# Patient Record
Sex: FEMALE | URBAN - NONMETROPOLITAN AREA
[De-identification: names, ages, dates, MRNs, and addresses within clinical notes are randomized per-mention and may not be internally consistent; named-entity substitution may affect disease eponyms.]

---

## 2023-02-03 ENCOUNTER — TRANSCRIBE ORDERS (OUTPATIENT)
Dept: MAMMOGRAPHY | Facility: CLINIC | Age: 43
End: 2023-02-03

## 2023-02-03 DIAGNOSIS — Z12.39 ENCOUNTER FOR SCREENING FOR MALIGNANT NEOPLASM OF BREAST, UNSPECIFIED SCREENING MODALITY: Primary | ICD-10-CM

## 2023-07-17 ENCOUNTER — TELEPHONE (OUTPATIENT)
Dept: FAMILY MEDICINE CLINIC | Facility: CLINIC | Age: 43
End: 2023-07-17

## 2023-07-17 NOTE — TELEPHONE ENCOUNTER
"  Hub staff attempted to follow warm transfer process and was unsuccessful     Caller: Vee Garvin \"JAME\"    Relationship to patient: Self    Best call back number: [phone]       Patient is needing: TO BE SCHEDULED FOR LABS AS SOON AS POSSIBLE        "

## 2023-07-19 PROBLEM — R06.02 SHORTNESS OF BREATH: Status: ACTIVE | Noted: 2023-07-19

## 2023-07-19 PROBLEM — I10 ESSENTIAL HYPERTENSION: Status: ACTIVE | Noted: 2023-07-19

## 2023-07-19 PROBLEM — R94.31 ABNORMAL EKG: Status: ACTIVE | Noted: 2023-07-19

## 2023-07-19 PROBLEM — R07.89 CHEST PAIN, ATYPICAL: Status: ACTIVE | Noted: 2023-07-19

## 2023-07-19 PROBLEM — E66.01 MORBID OBESITY WITH BMI OF 40.0-44.9, ADULT: Status: ACTIVE | Noted: 2023-07-19

## 2023-07-24 ENCOUNTER — CLINICAL SUPPORT (OUTPATIENT)
Dept: CARDIOLOGY | Facility: CLINIC | Age: 43
End: 2023-07-24
Payer: COMMERCIAL

## 2023-07-24 ENCOUNTER — TELEPHONE (OUTPATIENT)
Dept: CARDIOLOGY | Facility: CLINIC | Age: 43
End: 2023-07-24
Payer: COMMERCIAL

## 2023-07-24 DIAGNOSIS — R00.2 PALPITATIONS: Primary | ICD-10-CM

## 2023-07-24 PROCEDURE — 93000 ELECTROCARDIOGRAM COMPLETE: CPT | Performed by: INTERNAL MEDICINE

## 2023-07-24 NOTE — PROGRESS NOTES
"Pt arrived and EKG done and Dr. Lane reviewed.  She does not feel the \"racing of heart\" at this time.  HR was 71.  BP R arm 150/80 and she states she has had a headache for about 1-2 hours and some blurred vision.  She states earlier the heart racing happened while sitting down and she did not feel anxious or stressed.  She is currently describing some chest discomfort 1-2/10.  Occasionally some dizziness throughout the day.  She started taking the new prescription, Valsartan, last Thursday night.  Education done that Valsartan is for BP and does not effect the HR.  I advised her I would call her tomorrow if Dr. Lane makes any changes.  A 48 holter monitor was placed and patient is scheduled Thursday to have her echo done.  Patient understands she should go to Er if symptoms worsen or feeling like she is going to pass out.    "

## 2023-07-24 NOTE — TELEPHONE ENCOUNTER
Spoke with Mrs. Garvin and she advised she saw Dr Lane on 7/19 and he started her on Valsartan 160mg and she started it on Thursday evening.  Last night right before she was ready to take her dose her heart started racing. Today she feels her BP is up because she is having a headach, foggy vision, and feeling her heart race again.  She is wondering if this is from the medication.  I explained the Valsartan does not effect the HR but does lower BP. She is not near a BP machine at this time.  She is agreeable to come to the office now for a BP, EKG, and holter monitor.

## 2023-07-25 PROCEDURE — 93000 ELECTROCARDIOGRAM COMPLETE: CPT | Performed by: INTERNAL MEDICINE

## 2023-07-25 NOTE — PROGRESS NOTES
ECG 12 Lead    Date/Time: 7/25/2023 8:14 AM  Performed by: Bill Lane MD  Authorized by: Bill Lane MD   Comparison: compared with previous ECG from 7/19/2023  Similar to previous ECG  Rhythm: sinus rhythm  Rate: normal  QRS axis: normal  Other findings: non-specific ST-T wave changes    Clinical impression: abnormal EKG

## 2023-07-26 ENCOUNTER — TELEPHONE (OUTPATIENT)
Dept: CARDIOLOGY | Facility: CLINIC | Age: 43
End: 2023-07-26
Payer: COMMERCIAL

## 2023-07-26 RX ORDER — VALSARTAN 320 MG/1
320 TABLET ORAL DAILY
COMMUNITY

## 2023-07-26 NOTE — TELEPHONE ENCOUNTER
"----- Message from Bill Lane MD sent at 7/24/2023  9:01 PM EDT -----  She needs to increase the Valsartan to 320 mg, will see the Holter  ----- Message -----  From: Kiersten Weston RN  Sent: 7/24/2023   5:19 PM EDT  To: Bill Lane MD    Here is my note from today's visit:    Signed · Encounter Date: 7/24/2023     Pt arrived and EKG done and Dr. Lane reviewed.  She does not feel the \"racing of heart\" at this time.  HR was 71.  BP R arm 150/80 and she states she has had a headache for about 1-2 hours and some blurred vision.  She states earlier the heart racing happened while sitting down and she did not feel anxious or stressed.  She is currently describing some chest discomfort 1-2/10.  Occasionally some dizziness throughout the day.  She started taking the new prescription, Valsartan, last Thursday night.  Education done that Valsartan is for BP and does not effect the HR.  I advised her I would call her tomorrow if Dr. Lane makes any changes.  A 48 holter monitor was placed and patient is scheduled Thursday to have her echo done.  Patient understands she should go to Er if symptoms worsen or feeling like she is going to pass out.                  "

## 2023-08-01 ENCOUNTER — OFFICE VISIT (OUTPATIENT)
Dept: CARDIOLOGY | Facility: CLINIC | Age: 43
End: 2023-08-01
Payer: COMMERCIAL

## 2023-08-01 VITALS
SYSTOLIC BLOOD PRESSURE: 112 MMHG | WEIGHT: 237 LBS | HEIGHT: 63 IN | OXYGEN SATURATION: 100 % | DIASTOLIC BLOOD PRESSURE: 70 MMHG | HEART RATE: 73 BPM | BODY MASS INDEX: 41.99 KG/M2 | RESPIRATION RATE: 18 BRPM

## 2023-08-01 DIAGNOSIS — E66.01 MORBID OBESITY WITH BMI OF 40.0-44.9, ADULT: ICD-10-CM

## 2023-08-01 DIAGNOSIS — I10 ESSENTIAL HYPERTENSION: Primary | ICD-10-CM

## 2023-08-01 DIAGNOSIS — R00.2 PALPITATIONS: ICD-10-CM

## 2023-08-01 PROCEDURE — 3074F SYST BP LT 130 MM HG: CPT | Performed by: INTERNAL MEDICINE

## 2023-08-01 PROCEDURE — 99213 OFFICE O/P EST LOW 20 MIN: CPT | Performed by: INTERNAL MEDICINE

## 2023-08-01 PROCEDURE — 1160F RVW MEDS BY RX/DR IN RCRD: CPT | Performed by: INTERNAL MEDICINE

## 2023-08-01 PROCEDURE — 1159F MED LIST DOCD IN RCRD: CPT | Performed by: INTERNAL MEDICINE

## 2023-08-01 PROCEDURE — 3078F DIAST BP <80 MM HG: CPT | Performed by: INTERNAL MEDICINE

## 2023-08-01 PROCEDURE — 93000 ELECTROCARDIOGRAM COMPLETE: CPT | Performed by: INTERNAL MEDICINE

## 2023-08-01 RX ORDER — VALSARTAN 160 MG/1
160 TABLET ORAL DAILY
Qty: 90 TABLET | Refills: 2 | Status: SHIPPED | OUTPATIENT
Start: 2023-08-01

## 2023-10-19 ENCOUNTER — OFFICE VISIT (OUTPATIENT)
Dept: FAMILY MEDICINE CLINIC | Facility: CLINIC | Age: 43
End: 2023-10-19
Payer: COMMERCIAL

## 2023-10-19 VITALS
BODY MASS INDEX: 45 KG/M2 | HEIGHT: 63 IN | WEIGHT: 254 LBS | HEART RATE: 69 BPM | SYSTOLIC BLOOD PRESSURE: 130 MMHG | OXYGEN SATURATION: 98 % | DIASTOLIC BLOOD PRESSURE: 74 MMHG

## 2023-10-19 DIAGNOSIS — K21.9 GASTROESOPHAGEAL REFLUX DISEASE, UNSPECIFIED WHETHER ESOPHAGITIS PRESENT: ICD-10-CM

## 2023-10-19 DIAGNOSIS — R13.19 ESOPHAGEAL DYSPHAGIA: Primary | ICD-10-CM

## 2023-10-19 RX ORDER — PANTOPRAZOLE SODIUM 40 MG/1
40 TABLET, DELAYED RELEASE ORAL DAILY
Qty: 90 TABLET | Refills: 1 | Status: SHIPPED | OUTPATIENT
Start: 2023-10-19

## 2023-10-19 NOTE — PROGRESS NOTES
Office Note     Name: Vee Garvin    : 1980     MRN: [id number]     Chief Complaint  surgery follow up    Subjective     History of Present Illness:  Vee Garvin is a 43 y.o. female who presents today for dysphagia    Answers submitted by the patient for this visit:  Office Visit on 10/19/2023  8:15 AM with Jamime Beyer (Submitted on 10/17/2023)  Please describe your symptoms.: Food and drink sometimes gets stuck going down while eating. Chest pain after eating certain foods. Burp up stomach acid sometimes.  Have you had these symptoms before?: Yes  How long have you been having these symptoms?: Greater than 2 weeks  Please describe any probable cause for these symptoms. 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 They prescribed me pepcid,  but never took it.    -when swallowing feels like both food and drink gets stuck for a few seconds. Happens more often with solid food  -She had an EGD back in 2016 and was told that she had an erosion in the duodenum.  She never took prescribed medication.  She recently had a surgery and after the surgery her anesthesiologist told her that there was evidence of erosion visible.  They recommended that she follow-up with her PCP      Past Medical History: History reviewed. No pertinent past medical history.    Past Surgical History: History reviewed. No pertinent surgical history.    Immunizations:   There is no immunization history on file for this patient.     Medications:     Current Outpatient Medications:     valsartan (DIOVAN) 160 MG tablet, Take 1 tablet by mouth Daily., Disp: 90 tablet, Rfl: 2    pantoprazole (Protonix) 40 MG EC tablet, Take 1 tablet by mouth Daily., Disp: 90 tablet, Rfl: 1    Allergies:   Allergies   Allergen Reactions    Demerol [Meperidine] Swelling    Tramadol Swelling       Family History: History reviewed. No pertinent family history.    Social History:   Social  "History     Socioeconomic History    Marital status:    Tobacco Use    Smoking status: Never    Smokeless tobacco: Never   Vaping Use    Vaping Use: Never used   Substance and Sexual Activity    Alcohol use: Not Currently    Drug use: Never    Sexual activity: Defer         Objective     Vital Signs  /74 (BP Location: Left arm, Patient Position: Sitting, Cuff Size: Large Adult)   Pulse 69   Ht 160 cm (63\")   Wt 115 kg (254 lb)   SpO2 98%   BMI 44.99 kg/m²   Estimated body mass index is 44.99 kg/m² as calculated from the following:    Height as of this encounter: 160 cm (63\").    Weight as of this encounter: 115 kg (254 lb).    Physical Exam  Constitutional:       General: She is not in acute distress.     Appearance: Normal appearance.   HENT:      Head: Normocephalic and atraumatic.   Eyes:      Conjunctiva/sclera: Conjunctivae normal.   Cardiovascular:      Rate and Rhythm: Normal rate and regular rhythm.   Pulmonary:      Effort: Pulmonary effort is normal. No respiratory distress.   Abdominal:      General: Abdomen is flat. Bowel sounds are normal.      Palpations: Abdomen is soft.      Tenderness: There is no abdominal tenderness.   Skin:     General: Skin is warm and dry.   Neurological:      Mental Status: She is alert.   Psychiatric:         Mood and Affect: Mood normal.         Behavior: Behavior normal.         Thought Content: Thought content normal.          Assessment and Plan     Diagnoses and all orders for this visit:    1. Esophageal dysphagia (Primary)  -     pantoprazole (Protonix) 40 MG EC tablet; Take 1 tablet by mouth Daily.  Dispense: 90 tablet; Refill: 1  -     Ambulatory Referral to Gastroenterology    2. Gastroesophageal reflux disease, unspecified whether esophagitis present  -     pantoprazole (Protonix) 40 MG EC tablet; Take 1 tablet by mouth Daily.  Dispense: 90 tablet; Refill: 1  -     Ambulatory Referral to Gastroenterology    With known untreated erosive disease and " new dysphagia, I feel that patient would benefit from referral to gastroenterology for further work-up and likely EGD.  In the meantime we will go on and start patient on Protonix.  She is agreeable to taking this medication.  We will start with 40 mg once a day, can increase this.  She can follow-up at her earliest convenience for an annual wellness visit.         DO AVA Garza [facility]  [address] [address] [phone]

## 2023-11-13 ENCOUNTER — OFFICE VISIT (OUTPATIENT)
Dept: CARDIOLOGY | Facility: CLINIC | Age: 43
End: 2023-11-13
Payer: COMMERCIAL

## 2023-11-13 VITALS
OXYGEN SATURATION: 99 % | WEIGHT: 256 LBS | HEART RATE: 82 BPM | DIASTOLIC BLOOD PRESSURE: 74 MMHG | SYSTOLIC BLOOD PRESSURE: 116 MMHG | BODY MASS INDEX: 45.36 KG/M2 | TEMPERATURE: 98 F | RESPIRATION RATE: 18 BRPM | HEIGHT: 63 IN

## 2023-11-13 DIAGNOSIS — E66.01 MORBID OBESITY WITH BMI OF 40.0-44.9, ADULT: ICD-10-CM

## 2023-11-13 DIAGNOSIS — R00.2 PALPITATIONS: ICD-10-CM

## 2023-11-13 DIAGNOSIS — I10 ESSENTIAL HYPERTENSION: Primary | ICD-10-CM

## 2023-11-13 PROCEDURE — 3074F SYST BP LT 130 MM HG: CPT

## 2023-11-13 PROCEDURE — 1160F RVW MEDS BY RX/DR IN RCRD: CPT

## 2023-11-13 PROCEDURE — 1159F MED LIST DOCD IN RCRD: CPT

## 2023-11-13 PROCEDURE — 99213 OFFICE O/P EST LOW 20 MIN: CPT

## 2023-11-13 PROCEDURE — 3078F DIAST BP <80 MM HG: CPT

## 2023-11-13 NOTE — PROGRESS NOTES
[facility] [place]  [facility] CARDIOLOGY  [address] [place] [address]  Dept: [phone]  Dept Fax: [phone]    Vee Garvin  1980    Follow Up Office Visit Note    History of Present Illness:  Vee Garvin is a 43 y.o. female who presents to the clinic for Follow-up. HTN, palpitations.  She is doing well today, denies all major cardiac complaints.  States her palpitations have resolved.  Historically had normal work-up with Holter and echo.  Exam today seems normal.  We did spend time today discussing lifestyle modifications, nutritional guidance and dietary recommendations given.  Exercise encouraged as tolerated for weight loss.  Follow in 6 months or sooner as indicated.    The following portions of the patient's history were reviewed and updated as appropriate: allergies, current medications, past family history, past medical history, past social history, past surgical history, and problem list.    Medications:  pantoprazole  valsartan    Subjective  Allergies   Allergen Reactions    Demerol [Meperidine] Swelling    Tramadol Swelling        History reviewed. No pertinent past medical history.    History reviewed. No pertinent surgical history.    History reviewed. No pertinent family history.     Social History     Socioeconomic History    Marital status:    Tobacco Use    Smoking status: Never    Smokeless tobacco: Never   Vaping Use    Vaping Use: Never used   Substance and Sexual Activity    Alcohol use: Not Currently    Drug use: Never    Sexual activity: Defer       Review of Systems   All other systems reviewed and are negative.      Cardiovascular Procedures    ECHO/MUGA:   STRESS TESTS:   CARDIAC CATH:   DEVICES:   HOLTER:   CT/MRI:   VASCULAR:   CARDIOTHORACIC:     Objective  Vitals:    11/13/23 0923   BP: 116/74   BP Location: Right arm   Patient Position: Lying   Cuff Size: Adult   Pulse: 82   Resp: 18   Temp: 98 °F (36.7 °C)   TempSrc:  "Infrared   SpO2: 99%   Weight: 116 kg (256 lb)   Height: 160 cm (63\")   PainSc: 0-No pain     Body mass index is 45.35 kg/m².     Physical Exam  Vitals reviewed.   Constitutional:       General: Awake.      Appearance: Normal and healthy appearance. Not in distress. Obese.   Neck:      Vascular: No JVR. JVD normal.   Pulmonary:      Effort: Pulmonary effort is normal.      Breath sounds: Normal breath sounds. No wheezing. No rhonchi. No rales.   Chest:      Chest wall: Not tender to palpatation.   Cardiovascular:      PMI at left midclavicular line. Normal rate. Regular rhythm. Normal S1. Normal S2.       Murmurs: There is no murmur.      No gallop.  No click. No rub.   Pulses:     Intact distal pulses.   Edema:     Peripheral edema absent.   Abdominal:      General: Bowel sounds are normal.      Palpations: Abdomen is soft.      Tenderness: There is no abdominal tenderness.   Musculoskeletal: Normal range of motion.         General: No tenderness. Skin:     General: Skin is warm and dry.   Neurological:      General: No focal deficit present.      Mental Status: Alert and oriented to person, place and time.   Psychiatric:         Behavior: Behavior is cooperative.          Diagnostic Data  Procedures    Advance Care Planning          Assessment and Plan  Diagnoses and all orders for this visit:    1. Essential hypertension (Primary)  On valsartan 160 daily, BP is good today.  Continue therapy.    2. Palpitations  Resolved per patient.  Holter and echo are normal.  Will observe.  Advised adequate oral hydration, stress management, avoid caffeine.    3. Morbid obesity with BMI of 40.0-44.9, adult  BMI 45.35.  Nutritional guidance with dietary recommendations given today.  Exercise encouraged as tolerated.         Return in 6 months (on 5/13/2024) for Recheck, Dr. Lane.    Karina Tan, APRN  11/13/2023    Part of this note may be an electronic transcription/translation of spoken language to printed text " using the Dragon Dictation System.

## 2023-11-20 ENCOUNTER — OFFICE VISIT (OUTPATIENT)
Dept: FAMILY MEDICINE CLINIC | Facility: CLINIC | Age: 43
End: 2023-11-20
Payer: COMMERCIAL

## 2023-11-20 VITALS
HEIGHT: 63 IN | WEIGHT: 257 LBS | SYSTOLIC BLOOD PRESSURE: 136 MMHG | HEART RATE: 71 BPM | BODY MASS INDEX: 45.54 KG/M2 | DIASTOLIC BLOOD PRESSURE: 80 MMHG | OXYGEN SATURATION: 97 %

## 2023-11-20 DIAGNOSIS — G56.03 BILATERAL CARPAL TUNNEL SYNDROME: Primary | ICD-10-CM

## 2023-11-20 DIAGNOSIS — R20.2 PARESTHESIAS: ICD-10-CM

## 2023-11-20 PROCEDURE — 1159F MED LIST DOCD IN RCRD: CPT | Performed by: FAMILY MEDICINE

## 2023-11-20 PROCEDURE — 99213 OFFICE O/P EST LOW 20 MIN: CPT | Performed by: FAMILY MEDICINE

## 2023-11-20 PROCEDURE — 3079F DIAST BP 80-89 MM HG: CPT | Performed by: FAMILY MEDICINE

## 2023-11-20 PROCEDURE — 3075F SYST BP GE 130 - 139MM HG: CPT | Performed by: FAMILY MEDICINE

## 2023-11-20 PROCEDURE — 1160F RVW MEDS BY RX/DR IN RCRD: CPT | Performed by: FAMILY MEDICINE

## 2023-11-20 NOTE — PROGRESS NOTES
Follow Up Office Visit      Patient Name: Vee Garvin  : 1980   MRN: [id number]     Chief Complaint:    Chief Complaint   Patient presents with   • Follow-up       History of Present Illness: Vee Garvin is a 43 y.o. female who is here today to   be evaluated for some hand numbness and tingling she says been going on for about 2 months now mostly when she is moving.  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 and that is 10 bother shot to counter rest and shake it out.  She denies any trauma to her upper extremities and she says she has occasionally dropped things.  She says occasionally it will wake her up at night.    Blood pressure was initially elevated but better on recheck    She said she did have pretty severe anemia back in 2019 and had to get 10 units of blood transfused.          Subjective      Review of Systems:   Review of Systems    Past Medical History: History reviewed. No pertinent past medical history.    Past Surgical History: History reviewed. No pertinent surgical history.    Family History: History reviewed. No pertinent family history.    Social History:   Social History     Socioeconomic History   • Marital status:    Tobacco Use   • Smoking status: Never   • Smokeless tobacco: Never   Vaping Use   • Vaping Use: Never used   Substance and Sexual Activity   • Alcohol use: Not Currently   • Drug use: Never   • Sexual activity: Defer       Medications:     Current Outpatient Medications:   •  pantoprazole (Protonix) 40 MG EC tablet, Take 1 tablet by mouth Daily., Disp: 90 tablet, Rfl: 1  •  valsartan (DIOVAN) 160 MG tablet, Take 1 tablet by mouth Daily., Disp: 90 tablet, Rfl: 2    Allergies:   Allergies   Allergen Reactions   • Demerol [Meperidine] Swelling   • Tramadol Swelling       Objective     Physical Exam:  Vital Signs:   Vitals:    23 0808 23 0833   BP: 150/94 136/80   BP Location: Left  "arm    Patient Position: Sitting    Cuff Size: Large Adult Large Adult   Pulse: 71    SpO2: 97%    Weight: 117 kg (257 lb)    Height: 160 cm (63\")      Body mass index is 45.53 kg/m².     Physical Exam  Constitutional:       Appearance: Normal appearance. She is obese.   HENT:      Nose: Nose normal.   Musculoskeletal:      Comments: Good  strength good strength of hands no joint tenderness redness or swelling 2+ radial pulses bilaterally elbows and shoulders unremarkable  Positive Tinel's on the left more so than the right and positive Phalen's bilaterally.   Skin:     General: Skin is warm and dry.      Comments: Mild acne on cheeks   Neurological:      Mental Status: She is alert.   Psychiatric:         Mood and Affect: Mood normal.         Behavior: Behavior normal.         Procedures    PHQ-9 Total Score:       Assessment / Plan      Assessment/Plan:   Diagnoses and all orders for this visit:    1. Bilateral carpal tunnel syndrome (Primary)  -     Ambulatory Referral to Physical Therapy    2. Paresthesias  -     TSH; Future  -     Vitamin B12; Future  -     Comprehensive Metabolic Panel; Future  -     CBC Auto Differential; Future  -     Ambulatory Referral to Physical Therapy  -     TSH  -     Vitamin B12  -     Comprehensive Metabolic Panel  -     CBC Auto Differential         We will get some blood work today    Referred to Dr. Diamond to get EMGs done for possible carpal tunnel.  In the short-term she can get a little cock-up wrist splint if she like, monitor symptoms if any worse return sooner  Otherwise  follow-up on blood work and test in about 1 month         Follow Up:   Return in about 1 month (around 12/20/2023) for Recheck.        Patrick Ibarra MD  [facility]   Portions of note created with Dragon voice recognition technology  "

## 2023-11-21 LAB
ALBUMIN SERPL-MCNC: 4.4 G/DL (ref 3.9–4.9)
ALBUMIN/GLOB SERPL: 1.7 {RATIO} (ref 1.2–2.2)
ALP SERPL-CCNC: 61 IU/L (ref 44–121)
ALT SERPL-CCNC: 24 IU/L (ref 0–32)
AST SERPL-CCNC: 20 IU/L (ref 0–40)
BASOPHILS # BLD AUTO: 0 X10E3/UL (ref 0–0.2)
BASOPHILS NFR BLD AUTO: 1 %
BILIRUB SERPL-MCNC: 0.8 MG/DL (ref 0–1.2)
BUN SERPL-MCNC: 15 MG/DL (ref 6–24)
BUN/CREAT SERPL: 21 (ref 9–23)
CALCIUM SERPL-MCNC: 9.4 MG/DL (ref 8.7–10.2)
CHLORIDE SERPL-SCNC: 102 MMOL/L (ref 96–106)
CO2 SERPL-SCNC: 24 MMOL/L (ref 20–29)
CREAT SERPL-MCNC: 0.73 MG/DL (ref 0.57–1)
EGFRCR SERPLBLD CKD-EPI 2021: 105 ML/MIN/1.73
EOSINOPHIL # BLD AUTO: 0.5 X10E3/UL (ref 0–0.4)
EOSINOPHIL NFR BLD AUTO: 7 %
ERYTHROCYTE [DISTWIDTH] IN BLOOD BY AUTOMATED COUNT: 11.8 % (ref 11.7–15.4)
GLOBULIN SER CALC-MCNC: 2.6 G/DL (ref 1.5–4.5)
GLUCOSE SERPL-MCNC: 92 MG/DL (ref 70–99)
HCT VFR BLD AUTO: 38.2 % (ref 34–46.6)
HGB BLD-MCNC: 13 G/DL (ref 11.1–15.9)
IMM GRANULOCYTES # BLD AUTO: 0 X10E3/UL (ref 0–0.1)
IMM GRANULOCYTES NFR BLD AUTO: 0 %
LYMPHOCYTES # BLD AUTO: 1.7 X10E3/UL (ref 0.7–3.1)
LYMPHOCYTES NFR BLD AUTO: 25 %
MCH RBC QN AUTO: 31.3 PG (ref 26.6–33)
MCHC RBC AUTO-ENTMCNC: 34 G/DL (ref 31.5–35.7)
MCV RBC AUTO: 92 FL (ref 79–97)
MONOCYTES # BLD AUTO: 0.4 X10E3/UL (ref 0.1–0.9)
MONOCYTES NFR BLD AUTO: 6 %
NEUTROPHILS # BLD AUTO: 4.1 X10E3/UL (ref 1.4–7)
NEUTROPHILS NFR BLD AUTO: 61 %
PLATELET # BLD AUTO: 262 X10E3/UL (ref 150–450)
POTASSIUM SERPL-SCNC: 4.7 MMOL/L (ref 3.5–5.2)
PROT SERPL-MCNC: 7 G/DL (ref 6–8.5)
RBC # BLD AUTO: 4.16 X10E6/UL (ref 3.77–5.28)
SODIUM SERPL-SCNC: 138 MMOL/L (ref 134–144)
TSH SERPL DL<=0.005 MIU/L-ACNC: 3.76 UIU/ML (ref 0.45–4.5)
VIT B12 SERPL-MCNC: 554 PG/ML (ref 232–1245)
WBC # BLD AUTO: 6.7 X10E3/UL (ref 3.4–10.8)

## 2023-11-29 ENCOUNTER — TELEPHONE (OUTPATIENT)
Dept: FAMILY MEDICINE CLINIC | Facility: CLINIC | Age: 43
End: 2023-11-29

## 2023-11-29 NOTE — TELEPHONE ENCOUNTER
"  Caller: Vee Garvin \"JAME\"    Relationship: Self    Best call back number: [phone]    What is the best time to reach you: ANYTIME    Who are you requesting to speak with (clinical staff, provider,  specific staff member): CLINICAL STAFF    Do you know the name of the person who called: PATIENT/ JAME    What was the call regarding: PATIENT HAD NERVE TEST MONDAY 112723. WILL SHE BE REFERRED TO AN ORTHOPEDIC? SHE IS PRESENTLY SEEING AN ORTHOPEDIC OFFICE AND IS REQUESTING TO BE SEEN BY THIS ORTHOPEDIC    Is it okay if the provider responds through MyChart:           "

## 2023-11-30 ENCOUNTER — TELEPHONE (OUTPATIENT)
Dept: FAMILY MEDICINE CLINIC | Facility: CLINIC | Age: 43
End: 2023-11-30

## 2023-11-30 DIAGNOSIS — G56.03 BILATERAL CARPAL TUNNEL SYNDROME: Primary | ICD-10-CM

## 2023-11-30 NOTE — TELEPHONE ENCOUNTER
"Caller: Vee Garvin \"JAME\"    Relationship: Self    Best call back number: [phone]     What medication are you requesting: WRIST BRACES FOR BOTH HANDS    What are your current symptoms: BOTH WRIST IN PAIN WITH NUMBNESS AND TINGLING    How long have you been experiencing symptoms: A FEW MONTHS    Have you had these symptoms before:    [] Yes  [x] No    Have you been treated for these symptoms before:   [] Yes  [x] No    If a prescription is needed, what is your preferred pharmacy and phone number: [address] - [phone] [facility] [phone]      Additional notes:PATIENT IS REQUESTING WRIST BRACES FOR CARPEL TUNNEL.       PLEASE CALL PATIENT ONCE THE REQUEST HAS BEEN SENT IN AND APPROVED      "

## 2023-12-20 ENCOUNTER — OFFICE VISIT (OUTPATIENT)
Dept: FAMILY MEDICINE CLINIC | Facility: CLINIC | Age: 43
End: 2023-12-20
Payer: COMMERCIAL

## 2023-12-20 VITALS
HEART RATE: 64 BPM | OXYGEN SATURATION: 99 % | HEIGHT: 63 IN | BODY MASS INDEX: 45.36 KG/M2 | DIASTOLIC BLOOD PRESSURE: 88 MMHG | SYSTOLIC BLOOD PRESSURE: 120 MMHG | WEIGHT: 256 LBS

## 2023-12-20 DIAGNOSIS — I10 PRIMARY HYPERTENSION: ICD-10-CM

## 2023-12-20 DIAGNOSIS — G56.03 BILATERAL CARPAL TUNNEL SYNDROME: Primary | ICD-10-CM

## 2023-12-20 PROCEDURE — 99213 OFFICE O/P EST LOW 20 MIN: CPT | Performed by: FAMILY MEDICINE

## 2023-12-20 PROCEDURE — 3074F SYST BP LT 130 MM HG: CPT | Performed by: FAMILY MEDICINE

## 2023-12-20 PROCEDURE — 3079F DIAST BP 80-89 MM HG: CPT | Performed by: FAMILY MEDICINE

## 2023-12-20 PROCEDURE — 1160F RVW MEDS BY RX/DR IN RCRD: CPT | Performed by: FAMILY MEDICINE

## 2023-12-20 PROCEDURE — 1159F MED LIST DOCD IN RCRD: CPT | Performed by: FAMILY MEDICINE

## 2023-12-20 NOTE — PROGRESS NOTES
Follow Up Office Visit      Patient Name: Vee Garvin  : 1980   MRN: [id number]     Chief Complaint:    Chief Complaint   Patient presents with    Hypertension       History of Present Illness: Vee Garvin is a 43 y.o. female who is here today to   follow-up on recent EMG Dr. Mauricio her chiropractor did this testing did show some slowing at the wrist bilaterally.  She says she does drop things and does a lot of baking and is little bit of an aggravation for her.  Labs reviewed and they are all normal and she is doing well with her blood pressure follows up with Dr. Lane cardiology in a few months.    Review of Systems   Constitutional: Negative for fatigue and fever.   Respiratory: Negative for cough and shortness of breath.    Cardiovascular: Negative for chest pain and palpitations.   Skin: Negative for rash or itching      Subjective      Review of Systems:   Review of Systems    Past Medical History: History reviewed. No pertinent past medical history.    Past Surgical History: History reviewed. No pertinent surgical history.    Family History: History reviewed. No pertinent family history.    Social History:   Social History     Socioeconomic History    Marital status:    Tobacco Use    Smoking status: Never    Smokeless tobacco: Never   Vaping Use    Vaping Use: Never used   Substance and Sexual Activity    Alcohol use: Not Currently    Drug use: Never    Sexual activity: Defer       Medications:     Current Outpatient Medications:     pantoprazole (Protonix) 40 MG EC tablet, Take 1 tablet by mouth Daily., Disp: 90 tablet, Rfl: 1    valsartan (DIOVAN) 160 MG tablet, Take 1 tablet by mouth Daily., Disp: 90 tablet, Rfl: 2    Allergies:   Allergies   Allergen Reactions    Demerol [Meperidine] Swelling    Tramadol Swelling    Citrus Rash       Objective     Physical Exam:  Vital Signs:   Vitals:    23 1050   BP: 120/88   BP Location: Left arm   Patient Position:  "Sitting   Cuff Size: Large Adult   Pulse: 64   SpO2: 99%   Weight: 116 kg (256 lb)   Height: 160 cm (63\")   PainSc: 0-No pain     Body mass index is 45.35 kg/m².     Physical Exam  Vitals and nursing note reviewed.   Constitutional:       Appearance: Normal appearance.   HENT:      Head: Normocephalic and atraumatic.   Cardiovascular:      Rate and Rhythm: Normal rate and regular rhythm.   Pulmonary:      Effort: Pulmonary effort is normal.      Breath sounds: Normal breath sounds.   Musculoskeletal:         General: Normal range of motion.      Cervical back: Normal range of motion and neck supple.      Right lower leg: No edema.      Left lower leg: No edema.   Skin:     General: Skin is warm and dry.   Neurological:      General: No focal deficit present.      Mental Status: She is alert.         Procedures    PHQ-9 Total Score:       Assessment / Plan      Assessment/Plan:   Diagnoses and all orders for this visit:    1. Bilateral carpal tunnel syndrome (Primary)  -     Ambulatory Referral to Orthopedic Surgery    2. Primary hypertension  -     Comprehensive Metabolic Panel; Future         For her carpal tunnel she would like to go see Dr. Miller her orthopedist and we will refer her as requested in the short-term she says she really does not want to do any splinting or anything like that she may take Tylenol if she has pain as directed    For blood pressure continue with her current regimen and we will see her back in 6 months         Follow Up:   Return in about 6 months (around 6/20/2024) for Recheck, Labs prior next visit.        Patrick Ibarra MD  [facility] [place]   Portions of note created with Dragon voice recognition technology  "

## 2024-03-29 ENCOUNTER — TELEPHONE (OUTPATIENT)
Dept: CARDIOLOGY | Facility: CLINIC | Age: 44
End: 2024-03-29
Payer: COMMERCIAL

## 2024-03-29 RX ORDER — AMLODIPINE BESYLATE 5 MG/1
5 TABLET ORAL DAILY
Qty: 90 TABLET | Refills: 1 | Status: SHIPPED | OUTPATIENT
Start: 2024-03-29

## 2024-03-29 RX ORDER — AMLODIPINE BESYLATE 5 MG/1
5 TABLET ORAL DAILY
COMMUNITY
End: 2024-03-29 | Stop reason: SDUPTHER

## 2024-03-29 NOTE — TELEPHONE ENCOUNTER
Phone call to pt and she is having some memory issues and per side effects of Valsartan is confusion and memory loss per the pt. Also having head aches, she see's a therapist and she wanted the patient to let you know, she has a appt with you 05/10/2024

## 2024-03-29 NOTE — TELEPHONE ENCOUNTER
"Pt calling to discuss Valsartan rx. Believes the rx is causing various cognitive issues such as forgetfulness, misplacing items, etc. She stated it feels like her \"brain is getting worse.\" Please call pt to discuss.  "

## 2024-04-17 ENCOUNTER — CLINICAL SUPPORT (OUTPATIENT)
Dept: CARDIOLOGY | Facility: CLINIC | Age: 44
End: 2024-04-17
Payer: COMMERCIAL

## 2024-04-17 DIAGNOSIS — R00.2 PALPITATIONS: Primary | ICD-10-CM

## 2024-04-17 NOTE — PROGRESS NOTES
Dr Lane asked pt if she wanted to have a Holter or use her fit bit . She will use her fit bit and record EKG and send them to us by email or print them off .    ECG 12 Lead    Date/Time: 4/18/2024 8:28 AM  Performed by: Bill Lane MD    Authorized by: Bill Lane MD  Comparison: compared with previous ECG from 8/1/2023  Similar to previous ECG  Rhythm: sinus rhythm  Rate: normal  BPM: 61  QRS axis: normal  Other findings: non-specific ST-T wave changes    Clinical impression: abnormal EKG

## 2024-05-09 ENCOUNTER — OFFICE VISIT (OUTPATIENT)
Dept: FAMILY MEDICINE CLINIC | Facility: CLINIC | Age: 44
End: 2024-05-09
Payer: COMMERCIAL

## 2024-05-09 VITALS
HEART RATE: 73 BPM | WEIGHT: 272.5 LBS | OXYGEN SATURATION: 98 % | BODY MASS INDEX: 48.28 KG/M2 | SYSTOLIC BLOOD PRESSURE: 144 MMHG | DIASTOLIC BLOOD PRESSURE: 98 MMHG | HEIGHT: 63 IN

## 2024-05-09 DIAGNOSIS — R53.83 FATIGUE, UNSPECIFIED TYPE: ICD-10-CM

## 2024-05-09 DIAGNOSIS — R42 DIZZINESS: ICD-10-CM

## 2024-05-09 DIAGNOSIS — R00.2 PALPITATIONS: Primary | ICD-10-CM

## 2024-05-09 PROCEDURE — 1159F MED LIST DOCD IN RCRD: CPT | Performed by: STUDENT IN AN ORGANIZED HEALTH CARE EDUCATION/TRAINING PROGRAM

## 2024-05-09 PROCEDURE — 3077F SYST BP >= 140 MM HG: CPT | Performed by: STUDENT IN AN ORGANIZED HEALTH CARE EDUCATION/TRAINING PROGRAM

## 2024-05-09 PROCEDURE — 1160F RVW MEDS BY RX/DR IN RCRD: CPT | Performed by: STUDENT IN AN ORGANIZED HEALTH CARE EDUCATION/TRAINING PROGRAM

## 2024-05-09 PROCEDURE — 1126F AMNT PAIN NOTED NONE PRSNT: CPT | Performed by: STUDENT IN AN ORGANIZED HEALTH CARE EDUCATION/TRAINING PROGRAM

## 2024-05-09 PROCEDURE — 99214 OFFICE O/P EST MOD 30 MIN: CPT | Performed by: STUDENT IN AN ORGANIZED HEALTH CARE EDUCATION/TRAINING PROGRAM

## 2024-05-09 PROCEDURE — 3080F DIAST BP >= 90 MM HG: CPT | Performed by: STUDENT IN AN ORGANIZED HEALTH CARE EDUCATION/TRAINING PROGRAM

## 2024-05-09 RX ORDER — VALSARTAN 40 MG/1
160 TABLET ORAL DAILY
COMMUNITY
End: 2024-05-10 | Stop reason: ALTCHOICE

## 2024-05-10 ENCOUNTER — OFFICE VISIT (OUTPATIENT)
Dept: CARDIOLOGY | Facility: CLINIC | Age: 44
End: 2024-05-10
Payer: COMMERCIAL

## 2024-05-10 VITALS
RESPIRATION RATE: 12 BRPM | OXYGEN SATURATION: 96 % | WEIGHT: 271 LBS | HEART RATE: 73 BPM | SYSTOLIC BLOOD PRESSURE: 130 MMHG | BODY MASS INDEX: 48.02 KG/M2 | HEIGHT: 63 IN | DIASTOLIC BLOOD PRESSURE: 80 MMHG

## 2024-05-10 DIAGNOSIS — R00.2 PALPITATIONS: ICD-10-CM

## 2024-05-10 DIAGNOSIS — I10 ESSENTIAL HYPERTENSION: ICD-10-CM

## 2024-05-10 DIAGNOSIS — R07.89 CHEST PAIN, ATYPICAL: Primary | ICD-10-CM

## 2024-05-10 DIAGNOSIS — E66.01 MORBID OBESITY WITH BMI OF 40.0-44.9, ADULT: ICD-10-CM

## 2024-05-10 DIAGNOSIS — R06.02 SHORTNESS OF BREATH: ICD-10-CM

## 2024-05-10 LAB
ALBUMIN SERPL-MCNC: 4.2 G/DL (ref 3.9–4.9)
ALBUMIN/GLOB SERPL: 1.5 {RATIO} (ref 1.2–2.2)
ALP SERPL-CCNC: 54 IU/L (ref 44–121)
ALT SERPL-CCNC: 19 IU/L (ref 0–32)
AST SERPL-CCNC: 14 IU/L (ref 0–40)
BASOPHILS # BLD AUTO: 0.1 X10E3/UL (ref 0–0.2)
BASOPHILS NFR BLD AUTO: 1 %
BILIRUB SERPL-MCNC: 0.9 MG/DL (ref 0–1.2)
BUN SERPL-MCNC: 17 MG/DL (ref 6–24)
BUN/CREAT SERPL: 25 (ref 9–23)
CALCIUM SERPL-MCNC: 8.9 MG/DL (ref 8.7–10.2)
CHLORIDE SERPL-SCNC: 104 MMOL/L (ref 96–106)
CO2 SERPL-SCNC: 21 MMOL/L (ref 20–29)
CREAT SERPL-MCNC: 0.69 MG/DL (ref 0.57–1)
EGFRCR SERPLBLD CKD-EPI 2021: 110 ML/MIN/1.73
EOSINOPHIL # BLD AUTO: 0.3 X10E3/UL (ref 0–0.4)
EOSINOPHIL NFR BLD AUTO: 4 %
ERYTHROCYTE [DISTWIDTH] IN BLOOD BY AUTOMATED COUNT: 12.8 % (ref 11.7–15.4)
GLOBULIN SER CALC-MCNC: 2.8 G/DL (ref 1.5–4.5)
GLUCOSE SERPL-MCNC: 87 MG/DL (ref 70–99)
HBA1C MFR BLD: 5.1 % (ref 4.8–5.6)
HCT VFR BLD AUTO: 40.6 % (ref 34–46.6)
HGB BLD-MCNC: 13.8 G/DL (ref 11.1–15.9)
IMM GRANULOCYTES # BLD AUTO: 0 X10E3/UL (ref 0–0.1)
IMM GRANULOCYTES NFR BLD AUTO: 0 %
LYMPHOCYTES # BLD AUTO: 1.8 X10E3/UL (ref 0.7–3.1)
LYMPHOCYTES NFR BLD AUTO: 24 %
MCH RBC QN AUTO: 31.4 PG (ref 26.6–33)
MCHC RBC AUTO-ENTMCNC: 34 G/DL (ref 31.5–35.7)
MCV RBC AUTO: 92 FL (ref 79–97)
MONOCYTES # BLD AUTO: 0.3 X10E3/UL (ref 0.1–0.9)
MONOCYTES NFR BLD AUTO: 4 %
NEUTROPHILS # BLD AUTO: 5.1 X10E3/UL (ref 1.4–7)
NEUTROPHILS NFR BLD AUTO: 67 %
PLATELET # BLD AUTO: 275 X10E3/UL (ref 150–450)
POTASSIUM SERPL-SCNC: 4.3 MMOL/L (ref 3.5–5.2)
PROT SERPL-MCNC: 7 G/DL (ref 6–8.5)
RBC # BLD AUTO: 4.4 X10E6/UL (ref 3.77–5.28)
SODIUM SERPL-SCNC: 139 MMOL/L (ref 134–144)
TSH SERPL DL<=0.005 MIU/L-ACNC: 2.74 UIU/ML (ref 0.45–4.5)
WBC # BLD AUTO: 7.5 X10E3/UL (ref 3.4–10.8)

## 2024-05-10 PROCEDURE — 3075F SYST BP GE 130 - 139MM HG: CPT | Performed by: INTERNAL MEDICINE

## 2024-05-10 PROCEDURE — 99214 OFFICE O/P EST MOD 30 MIN: CPT | Performed by: INTERNAL MEDICINE

## 2024-05-10 PROCEDURE — 93000 ELECTROCARDIOGRAM COMPLETE: CPT | Performed by: INTERNAL MEDICINE

## 2024-05-10 PROCEDURE — 1160F RVW MEDS BY RX/DR IN RCRD: CPT | Performed by: INTERNAL MEDICINE

## 2024-05-10 PROCEDURE — 1159F MED LIST DOCD IN RCRD: CPT | Performed by: INTERNAL MEDICINE

## 2024-05-10 PROCEDURE — 3079F DIAST BP 80-89 MM HG: CPT | Performed by: INTERNAL MEDICINE

## 2024-05-10 RX ORDER — VALSARTAN 160 MG/1
160 TABLET ORAL DAILY
Qty: 90 TABLET | Refills: 3 | Status: SHIPPED | OUTPATIENT
Start: 2024-05-10

## 2024-05-14 LAB
APO B SERPL-MCNC: 86 MG/DL
CHOLEST SERPL-MCNC: 174 MG/DL (ref 100–199)
HDLC SERPL-MCNC: 48 MG/DL
LDLC SERPL CALC-MCNC: 115 MG/DL (ref 0–99)
LPA SERPL-SCNC: 34 NMOL/L
TRIGL SERPL-MCNC: 57 MG/DL (ref 0–149)
VLDLC SERPL CALC-MCNC: 11 MG/DL (ref 5–40)

## 2024-05-15 ENCOUNTER — TELEPHONE (OUTPATIENT)
Dept: CARDIOLOGY | Facility: CLINIC | Age: 44
End: 2024-05-15
Payer: COMMERCIAL

## 2024-05-15 NOTE — TELEPHONE ENCOUNTER
Left a message for Ms Garvin that her Apo B and Lpa are good, Ldl is mildly elevated 115.  Please eat low carbs and also exercise

## 2024-05-30 ENCOUNTER — TELEPHONE (OUTPATIENT)
Dept: CARDIOLOGY | Facility: CLINIC | Age: 44
End: 2024-05-30
Payer: COMMERCIAL

## 2024-05-30 ENCOUNTER — CLINICAL SUPPORT (OUTPATIENT)
Dept: CARDIOLOGY | Facility: CLINIC | Age: 44
End: 2024-05-30
Payer: COMMERCIAL

## 2024-05-30 DIAGNOSIS — R94.31 ABNORMAL EKG: Primary | ICD-10-CM

## 2024-05-30 NOTE — TELEPHONE ENCOUNTER
Spoke with Mr. Garvin and he is going to relay to Mrs Garvin that Dr. Lane reviewed v/s and EKG and all was good.  He would like to wait for you to have your echo to decide further how to treat.

## 2024-05-30 NOTE — PROGRESS NOTES
Per Mrs Garvin around 3am on 5/24 she states her heart and body just felt like it was jittery and she did not feel right.  She took her BP and the reading was 188/139.  She has her BP cuff with her and I advised the cuff she is using is an adult and too small for her arm and that can cause wrong readings.  She showed the picture of the BP screen.  She stated she did have a little chest pain lasting about 10 minutes during the episode. She states she had to get up out of bed and walk a round for a while before symptoms cleared up.  I went over the holter monitor results with her and explained the findings.  You do have PVC's 4% of time but otherwise a benign monitor study.  Your EKG today looks like previous but I will have Dr. Lane review and if any recommendations will call her later.  Continue to have the echo on 6/7 and f/u with Dr. Lane on 6/11. Pt verbalized understanding.       EKG done.    /74  Hr 74, 99%    ECG 12 Lead    Date/Time: 5/31/2024 4:37 PM  Performed by: Bill Lane MD    Authorized by: Bill Lane MD  Comparison: compared with previous ECG from 5/10/2024  Similar to previous ECG  Rhythm: sinus rhythm  Ectopy: atrial premature contractions  Rate: normal  BPM: 73  QRS axis: normal    Clinical impression: abnormal EKG

## 2024-05-31 PROCEDURE — 93000 ELECTROCARDIOGRAM COMPLETE: CPT | Performed by: INTERNAL MEDICINE

## 2024-06-11 ENCOUNTER — OFFICE VISIT (OUTPATIENT)
Dept: CARDIOLOGY | Facility: CLINIC | Age: 44
End: 2024-06-11
Payer: COMMERCIAL

## 2024-06-11 VITALS
HEIGHT: 63 IN | OXYGEN SATURATION: 96 % | RESPIRATION RATE: 18 BRPM | DIASTOLIC BLOOD PRESSURE: 84 MMHG | BODY MASS INDEX: 48.2 KG/M2 | WEIGHT: 272 LBS | SYSTOLIC BLOOD PRESSURE: 136 MMHG | HEART RATE: 84 BPM

## 2024-06-11 DIAGNOSIS — R07.89 CHEST PAIN, ATYPICAL: Primary | ICD-10-CM

## 2024-06-11 DIAGNOSIS — E66.01 MORBID OBESITY WITH BMI OF 40.0-44.9, ADULT: ICD-10-CM

## 2024-06-11 DIAGNOSIS — R00.2 PALPITATIONS: ICD-10-CM

## 2024-06-11 DIAGNOSIS — R94.31 ABNORMAL EKG: ICD-10-CM

## 2024-06-11 DIAGNOSIS — R06.02 SHORTNESS OF BREATH: ICD-10-CM

## 2024-06-11 PROCEDURE — 3079F DIAST BP 80-89 MM HG: CPT | Performed by: INTERNAL MEDICINE

## 2024-06-11 PROCEDURE — 3075F SYST BP GE 130 - 139MM HG: CPT | Performed by: INTERNAL MEDICINE

## 2024-06-11 PROCEDURE — 1159F MED LIST DOCD IN RCRD: CPT | Performed by: INTERNAL MEDICINE

## 2024-06-11 PROCEDURE — 1160F RVW MEDS BY RX/DR IN RCRD: CPT | Performed by: INTERNAL MEDICINE

## 2024-06-11 PROCEDURE — 99213 OFFICE O/P EST LOW 20 MIN: CPT | Performed by: INTERNAL MEDICINE

## 2024-06-11 RX ORDER — METOPROLOL SUCCINATE 50 MG/1
50 TABLET, EXTENDED RELEASE ORAL DAILY
Qty: 30 TABLET | Refills: 11 | Status: SHIPPED | OUTPATIENT
Start: 2024-06-11

## 2024-06-11 NOTE — PROGRESS NOTES
[facility] [place]  [facility] CARDIOLOGY  [address] [place] [address]  Dept: [phone]  Dept Fax: [phone]    Vee Garvin  1980    Follow Up Office Visit Note    History of Present Illness:  Vee Garvin is a 44 y.o. female who presents to the clinic for Follow-up. Palpitations - She has the Holter with 4% PVC., her complaints are inconsistent with the PVC`S,  her echo was also normal, will use Toprol xl 50 mg BP is 130.80, she takes Valsartan 160 mg    The following portions of the patient's history were reviewed and updated as appropriate: allergies, current medications, past family history, past medical history, past social history, past surgical history, and problem list.    Medications:  metoprolol succinate XL  valsartan    Subjective  Allergies   Allergen Reactions    Demerol [Meperidine] Swelling    Tramadol Swelling    [place] Rash        Past Medical History:   Diagnosis Date    Anemia     Cat scratch fever     Hypertension     Scurvy        Past Surgical History:   Procedure Laterality Date    KNEE SURGERY         Family History   Problem Relation Age of Onset    Asthma Mother     Hypertension Mother     Atrial fibrillation Mother     Fibroids Mother     Diabetes Father     Hypertension Father     Neuropathy Father     High cholesterol Father         Social History     Socioeconomic History    Marital status:    Tobacco Use    Smoking status: Never    Smokeless tobacco: Never   Vaping Use    Vaping status: Never Used   Substance and Sexual Activity    Alcohol use: Not Currently    Drug use: Never    Sexual activity: Defer       Review of Systems   Constitutional: Negative.    HENT: Negative.     Respiratory: Negative.     Cardiovascular: Negative.  Positive for palpitations.   Endocrine: Negative.    Genitourinary: Negative.    Musculoskeletal: Negative.    Skin: Negative.    Allergic/Immunologic: Negative.    Neurological: Negative.   "  Hematological: Negative.    Psychiatric/Behavioral: Negative.       Cardiovascular Procedures    ECHO/MUGA:  STRESS TESTS:   CARDIAC CATH:   DEVICES:   HOLTER:   CT/MRI:   VASCULAR:   CARDIOTHORACIC:     Objective  Vitals:    06/11/24 1039   BP: 136/84   BP Location: Right arm   Patient Position: Lying   Cuff Size: Adult   Pulse: 84   Resp: 18   SpO2: 96%   Weight: 123 kg (272 lb)   Height: 160 cm (63\")   PainSc: 0-No pain     Body mass index is 48.18 kg/m².     Physical Exam  Vitals reviewed.   Constitutional:       Appearance: Healthy appearance. Not in distress.   Neck:      Vascular: No JVR. JVD normal.   Pulmonary:      Effort: Pulmonary effort is normal.      Breath sounds: Normal breath sounds. No wheezing. No rhonchi. No rales.   Chest:      Chest wall: Not tender to palpatation.   Cardiovascular:      PMI at left midclavicular line. Normal rate. Regular rhythm. Normal S1. Normal S2.       Murmurs: There is no murmur.      No gallop.  No click. No rub.   Pulses:     Intact distal pulses.   Edema:     Peripheral edema absent.   Abdominal:      General: Bowel sounds are normal.      Palpations: Abdomen is soft.      Tenderness: There is no abdominal tenderness.   Musculoskeletal: Normal range of motion.         General: No tenderness. Skin:     General: Skin is warm and dry.   Neurological:      General: No focal deficit present.      Mental Status: Alert and oriented to person, place and time.        Diagnostic Data  Procedures    Assessment and Plan  Diagnoses and all orders for this visit:    Chest pain, atypical-seems more related to the palpitations    Palpitations- occasional to frequents PVC`s, will start Toprol xl 50 mg    Shortness of breath- echo normal, likely related to her weight    Morbid obesity with BMI of 40.0-44.9, adult- as above BMI 48 advised to exercise, and eat low carbs      Other orders  -     metoprolol succinate XL (TOPROL-XL) 50 MG 24 hr tablet; Take 1 tablet by mouth Daily.     "     Return in about 2 months (around 8/11/2024) for Recheck with Dr. Lane.    Bill Lane MD  06/11/2024

## 2024-06-13 ENCOUNTER — LAB (OUTPATIENT)
Dept: FAMILY MEDICINE CLINIC | Facility: CLINIC | Age: 44
End: 2024-06-13
Payer: COMMERCIAL

## 2024-06-13 DIAGNOSIS — I10 PRIMARY HYPERTENSION: ICD-10-CM

## 2024-06-13 PROCEDURE — 36415 COLL VENOUS BLD VENIPUNCTURE: CPT | Performed by: FAMILY MEDICINE

## 2024-06-14 LAB
ALBUMIN SERPL-MCNC: 4.2 G/DL (ref 3.9–4.9)
ALBUMIN/GLOB SERPL: 1.5 {RATIO}
ALP SERPL-CCNC: 54 IU/L (ref 44–121)
ALT SERPL-CCNC: 19 IU/L (ref 0–32)
AST SERPL-CCNC: 17 IU/L (ref 0–40)
BILIRUB SERPL-MCNC: 0.9 MG/DL (ref 0–1.2)
BUN SERPL-MCNC: 16 MG/DL (ref 6–24)
BUN/CREAT SERPL: 24 (ref 9–23)
CALCIUM SERPL-MCNC: 9.2 MG/DL (ref 8.7–10.2)
CHLORIDE SERPL-SCNC: 102 MMOL/L (ref 96–106)
CO2 SERPL-SCNC: 21 MMOL/L (ref 20–29)
CREAT SERPL-MCNC: 0.68 MG/DL (ref 0.57–1)
EGFRCR SERPLBLD CKD-EPI 2021: 110 ML/MIN/1.73
GLOBULIN SER CALC-MCNC: 2.8 G/DL (ref 1.5–4.5)
GLUCOSE SERPL-MCNC: 90 MG/DL (ref 70–99)
POTASSIUM SERPL-SCNC: 4.1 MMOL/L (ref 3.5–5.2)
PROT SERPL-MCNC: 7 G/DL (ref 6–8.5)
SODIUM SERPL-SCNC: 137 MMOL/L (ref 134–144)

## 2024-06-24 ENCOUNTER — OFFICE VISIT (OUTPATIENT)
Dept: FAMILY MEDICINE CLINIC | Facility: CLINIC | Age: 44
End: 2024-06-24
Payer: COMMERCIAL

## 2024-06-24 VITALS
HEIGHT: 63 IN | OXYGEN SATURATION: 99 % | DIASTOLIC BLOOD PRESSURE: 86 MMHG | WEIGHT: 269 LBS | HEART RATE: 78 BPM | BODY MASS INDEX: 47.66 KG/M2 | SYSTOLIC BLOOD PRESSURE: 134 MMHG

## 2024-06-24 DIAGNOSIS — R53.83 FATIGUE, UNSPECIFIED TYPE: ICD-10-CM

## 2024-06-24 DIAGNOSIS — M25.50 ARTHRALGIA, UNSPECIFIED JOINT: ICD-10-CM

## 2024-06-24 DIAGNOSIS — N95.1 MENOPAUSAL SYMPTOMS: ICD-10-CM

## 2024-06-24 DIAGNOSIS — N91.2 AMENORRHEA: Primary | ICD-10-CM

## 2024-06-24 LAB
B-HCG UR QL: NEGATIVE
EXPIRATION DATE: NORMAL
INTERNAL NEGATIVE CONTROL: NORMAL
INTERNAL POSITIVE CONTROL: NORMAL
Lab: NORMAL

## 2024-06-24 PROCEDURE — 3079F DIAST BP 80-89 MM HG: CPT | Performed by: FAMILY MEDICINE

## 2024-06-24 PROCEDURE — 3075F SYST BP GE 130 - 139MM HG: CPT | Performed by: FAMILY MEDICINE

## 2024-06-24 PROCEDURE — 1159F MED LIST DOCD IN RCRD: CPT | Performed by: FAMILY MEDICINE

## 2024-06-24 PROCEDURE — 99214 OFFICE O/P EST MOD 30 MIN: CPT | Performed by: FAMILY MEDICINE

## 2024-06-24 PROCEDURE — 81025 URINE PREGNANCY TEST: CPT | Performed by: FAMILY MEDICINE

## 2024-06-24 PROCEDURE — 1126F AMNT PAIN NOTED NONE PRSNT: CPT | Performed by: FAMILY MEDICINE

## 2024-06-24 PROCEDURE — 1160F RVW MEDS BY RX/DR IN RCRD: CPT | Performed by: FAMILY MEDICINE

## 2024-06-24 PROCEDURE — 36415 COLL VENOUS BLD VENIPUNCTURE: CPT | Performed by: FAMILY MEDICINE

## 2024-06-24 NOTE — PROGRESS NOTES
Follow Up Office Visit      Patient Name: Vee Garvin  : 1980   MRN: [id number]     Chief Complaint:    Chief Complaint   Patient presents with    Follow-up       History of Present Illness: Vee Garvin is a 44 y.o. female who is here today to   be evaluated for her hormone says she has not had a period since February and feels like she is menopausal as she has hot flashes and then goff can be happy and then will start crying and also says she has had fatigue and is tired a lot and forgetfulness    Relates she has had irregular periods for about 5 years and saw gynecology and had an ultrasound and some workups and they were going to start her on birth control pills but she did not want to do this    More recently she saw another provider who told her there is no way to check her hormone status and she will just have to live with it.--She is not completely sure if she wants to take a medicine but does have these mood swings and may cry  at times  She also relates that she struggled with low libido--but relates that her  has been patient--he has had a vasectomy so she does not feel she is pregnant    She also complains of joint aches mostly her hands and feet and a little bit of low back pain    She does see cardiology and has been diagnosed with PVCs    She says she has had a rash on her face off and on    Review of Systems   Constitutional: Positive for fatigue and negative for fever.   Respiratory: Negative for cough and shortness of breath.    Cardiovascular: Negative for chest pain and palpitations.   Skin: Negative for rash or itching      Subjective      Review of Systems:   Review of Systems    Past Medical History:   Past Medical History:   Diagnosis Date    Anemia     Cat scratch fever     Hypertension     Scurvy        Past Surgical History:   Past Surgical History:   Procedure Laterality Date    KNEE SURGERY         Family History:   Family History   Problem Relation  "Age of Onset    Asthma Mother     Hypertension Mother     Atrial fibrillation Mother     Fibroids Mother     Diabetes Father     Hypertension Father     Neuropathy Father     High cholesterol Father        Social History:   Social History     Socioeconomic History    Marital status:    Tobacco Use    Smoking status: Never    Smokeless tobacco: Never   Vaping Use    Vaping status: Never Used   Substance and Sexual Activity    Alcohol use: Not Currently    Drug use: Never    Sexual activity: Defer       Medications:     Current Outpatient Medications:     metoprolol succinate XL (TOPROL-XL) 50 MG 24 hr tablet, Take 1 tablet by mouth Daily., Disp: 30 tablet, Rfl: 11    valsartan (DIOVAN) 160 MG tablet, Take 1 tablet by mouth Daily., Disp: 90 tablet, Rfl: 3    Allergies:   Allergies   Allergen Reactions    Demerol [Meperidine] Swelling    Tramadol Swelling    Citrus Rash       Objective     Physical Exam:  Vital Signs:   Vitals:    06/24/24 1119   BP: 134/86   BP Location: Left arm   Patient Position: Sitting   Cuff Size: Large Adult   Pulse: 78   SpO2: 99%   Weight: 122 kg (269 lb)   Height: 160 cm (63\")     Facility age limit for growth %ariel is 20 years.  Body mass index is 47.65 kg/m².     Physical Exam  Vitals and nursing note reviewed.   Constitutional:       Appearance: Normal appearance. She is obese.      Comments: Alert oriented pleasant white female no acute distress little tearful at times   HENT:      Head: Normocephalic and atraumatic.      Nose: Nose normal.   Cardiovascular:      Rate and Rhythm: Normal rate and regular rhythm.   Pulmonary:      Effort: Pulmonary effort is normal.      Breath sounds: Normal breath sounds.   Abdominal:      Palpations: Abdomen is soft.      Tenderness: There is no abdominal tenderness.   Musculoskeletal:         General: No tenderness. Normal range of motion.      Cervical back: Normal range of motion and neck supple.      Right lower leg: No edema.      Left lower " leg: No edema.   Skin:     General: Skin is warm and dry.      Comments: She does have a red butterfly type rash on her cheeks and then extends up above her eyebrows red almost looks acne-like in places   Neurological:      General: No focal deficit present.      Mental Status: She is alert.   Psychiatric:         Mood and Affect: Mood normal.         Behavior: Behavior normal.         Procedures    PHQ-9 Total Score:       Assessment / Plan      Assessment/Plan:   Diagnoses and all orders for this visit:    1. Amenorrhea (Primary)  -     FSH & LH; Future  -     POC Pregnancy, Urine  -     FSH & LH    2. Fatigue, unspecified type  -     CBC Auto Differential; Future  -     FSH & LH; Future  -     Vitamin B12; Future  -     CBC Auto Differential  -     FSH & LH  -     Vitamin B12    3. Menopausal symptoms    4. Arthralgia, unspecified joint  -     Uric Acid; Future  -     Sedimentation Rate; Future  -     Rheumatoid Factor; Future  -     Cyclic Citrul Peptide Antibody, IgG / IgA; Future  -     ROSALINDA by IFA, Reflex to Titer and Pattern; Future  -     Sjogren's Antibody, Anti-SS-A / -SS-B; Future  -     Uric Acid  -     Sedimentation Rate  -     Rheumatoid Factor  -     Cyclic Citrul Peptide Antibody, IgG / IgA  -     ROSALINDA by IFA, Reflex to Titer and Pattern  -     Sjogren's Antibody, Anti-SS-A / -SS-B         Will get a urine hCG to make sure she not pregnant    Will get some lab work to workup her fatigue and hormone issues as well as her joint pain.  Follow-up on this test results in about 1 month sooner if worse    She did not wish to start a medication right now to help with what sounds like perimenopausal state and mood swings etc.         Follow Up:   Return in about 1 month (around 7/24/2024).        Patrick Ibarra MD  [facility] [place] [facility]   Portions of note created with Dragon voice recognition technology

## 2024-06-25 DIAGNOSIS — R76.8 ANA POSITIVE: Primary | ICD-10-CM

## 2024-06-25 LAB
ANA SER QL IF: POSITIVE
ANA SPECKLED TITR SER: ABNORMAL {TITER}
BASOPHILS # BLD AUTO: 0.1 X10E3/UL (ref 0–0.2)
BASOPHILS NFR BLD AUTO: 1 %
CCP IGA+IGG SERPL IA-ACNC: 7 UNITS (ref 0–19)
ENA SS-A AB SER-ACNC: <0.2 AI (ref 0–0.9)
ENA SS-B AB SER-ACNC: <0.2 AI (ref 0–0.9)
EOSINOPHIL # BLD AUTO: 0.4 X10E3/UL (ref 0–0.4)
EOSINOPHIL NFR BLD AUTO: 4 %
ERYTHROCYTE [DISTWIDTH] IN BLOOD BY AUTOMATED COUNT: 13 % (ref 11.7–15.4)
ERYTHROCYTE [SEDIMENTATION RATE] IN BLOOD BY WESTERGREN METHOD: 5 MM/HR (ref 0–32)
FSH SERPL-ACNC: 16.6 MIU/ML
HCT VFR BLD AUTO: 39.7 % (ref 34–46.6)
HGB BLD-MCNC: 13.3 G/DL (ref 11.1–15.9)
IMM GRANULOCYTES # BLD AUTO: 0 X10E3/UL (ref 0–0.1)
IMM GRANULOCYTES NFR BLD AUTO: 0 %
LH SERPL-ACNC: 11.5 MIU/ML
LYMPHOCYTES # BLD AUTO: 1.8 X10E3/UL (ref 0.7–3.1)
LYMPHOCYTES NFR BLD AUTO: 21 %
Lab: ABNORMAL
MCH RBC QN AUTO: 31.8 PG (ref 26.6–33)
MCHC RBC AUTO-ENTMCNC: 33.5 G/DL (ref 31.5–35.7)
MCV RBC AUTO: 95 FL (ref 79–97)
MONOCYTES # BLD AUTO: 0.5 X10E3/UL (ref 0.1–0.9)
MONOCYTES NFR BLD AUTO: 6 %
NEUTROPHILS # BLD AUTO: 6 X10E3/UL (ref 1.4–7)
NEUTROPHILS NFR BLD AUTO: 68 %
PLATELET # BLD AUTO: 270 X10E3/UL (ref 150–450)
RBC # BLD AUTO: 4.18 X10E6/UL (ref 3.77–5.28)
URATE SERPL-MCNC: 5 MG/DL (ref 2.6–6.2)
VIT B12 SERPL-MCNC: 500 PG/ML (ref 232–1245)
WBC # BLD AUTO: 8.7 X10E3/UL (ref 3.4–10.8)

## 2024-06-26 ENCOUNTER — TELEPHONE (OUTPATIENT)
Dept: FAMILY MEDICINE CLINIC | Facility: CLINIC | Age: 44
End: 2024-06-26
Payer: COMMERCIAL

## 2024-06-26 NOTE — TELEPHONE ENCOUNTER
Called pt and gave message below..    Your labs/ tests are abnormal--- I referred you to Rheumatology for the + ROSALINDA --please make appointment to discuss if any questions.

## 2024-06-26 NOTE — TELEPHONE ENCOUNTER
"Caller: Vee Garvin \"JAME\"    Relationship: Self    Best call back number: [phone]     Caller requesting test results    What test was performed: LAB WORK     When was the test performed: 6.24.24    Where was the test performed: [facility]     Additional notes: PLEASE CALL PATIENT BACK, IF NO ANSWER LEAVE A DETAILED MESSAGE.  "

## 2024-07-08 ENCOUNTER — TELEPHONE (OUTPATIENT)
Dept: CARDIOLOGY | Facility: CLINIC | Age: 44
End: 2024-07-08
Payer: COMMERCIAL

## 2024-07-08 NOTE — TELEPHONE ENCOUNTER
Patient called to report that she is experiencing extreme Exhaustion, Dizziness, and Nausea side effects from her METOPROLOL prescription. Patient is requesting an alternative medication.

## 2024-07-09 NOTE — TELEPHONE ENCOUNTER
Bill Lane MD Burris, Theresa, MA  Caller: Unspecified (Yesterday, 10:15 AM)  Stop the toprol, , for few days and when you feels better start Flecainide 50 mg, this is a higher risk meds,  so be sure you come back in 10 days after to have an EKG. Spoke with pt and she will call me on Friday

## 2024-07-09 NOTE — TELEPHONE ENCOUNTER
Patient called to report that she is experiencing extreme Exhaustion, Dizziness, and Nausea side effects from her METOPROLOL Pt just started this medication.  on Friday. prescription. Patient is requesting an alternative medication. Please advise?

## 2024-07-12 NOTE — TELEPHONE ENCOUNTER
Spoke with pt she wanted to look into this medication . Pt is asking if there is something besides Flecainide . Please advise?

## 2024-07-12 NOTE — TELEPHONE ENCOUNTER
Bill Lane MD   to Me   RH    7/12/24  4:03 PM  Yes there are more with higher raciel so flecainide is the best choice for you.    PT SAID SHE WILL GO BACK ON THE METOPROLOL AND CALL ME IN 2 WEEKS .

## 2024-08-20 ENCOUNTER — OFFICE VISIT (OUTPATIENT)
Dept: FAMILY MEDICINE CLINIC | Facility: CLINIC | Age: 44
End: 2024-08-20
Payer: COMMERCIAL

## 2024-08-20 VITALS
RESPIRATION RATE: 16 BRPM | SYSTOLIC BLOOD PRESSURE: 122 MMHG | OXYGEN SATURATION: 99 % | WEIGHT: 271 LBS | DIASTOLIC BLOOD PRESSURE: 82 MMHG | BODY MASS INDEX: 48.02 KG/M2 | HEIGHT: 63 IN | HEART RATE: 61 BPM

## 2024-08-20 DIAGNOSIS — I10 PRIMARY HYPERTENSION: ICD-10-CM

## 2024-08-20 DIAGNOSIS — F41.9 ANXIETY: ICD-10-CM

## 2024-08-20 DIAGNOSIS — J30.9 ALLERGIC RHINITIS, UNSPECIFIED SEASONALITY, UNSPECIFIED TRIGGER: ICD-10-CM

## 2024-08-20 DIAGNOSIS — R76.8 ANA POSITIVE: Primary | ICD-10-CM

## 2024-08-20 PROCEDURE — 3079F DIAST BP 80-89 MM HG: CPT | Performed by: FAMILY MEDICINE

## 2024-08-20 PROCEDURE — 3074F SYST BP LT 130 MM HG: CPT | Performed by: FAMILY MEDICINE

## 2024-08-20 PROCEDURE — 1160F RVW MEDS BY RX/DR IN RCRD: CPT | Performed by: FAMILY MEDICINE

## 2024-08-20 PROCEDURE — 99214 OFFICE O/P EST MOD 30 MIN: CPT | Performed by: FAMILY MEDICINE

## 2024-08-20 PROCEDURE — 1126F AMNT PAIN NOTED NONE PRSNT: CPT | Performed by: FAMILY MEDICINE

## 2024-08-20 PROCEDURE — 1159F MED LIST DOCD IN RCRD: CPT | Performed by: FAMILY MEDICINE

## 2024-08-20 RX ORDER — KETOTIFEN FUMARATE 0.35 MG/ML
1 SOLUTION/ DROPS OPHTHALMIC 2 TIMES DAILY
Qty: 5 ML | Refills: 1 | Status: SHIPPED | OUTPATIENT
Start: 2024-08-20

## 2024-08-20 RX ORDER — HYDROXYZINE PAMOATE 25 MG/1
25 CAPSULE ORAL EVERY 4 HOURS PRN
Qty: 90 CAPSULE | Refills: 1 | Status: SHIPPED | OUTPATIENT
Start: 2024-08-20

## 2024-08-20 RX ORDER — LORATADINE 10 MG/1
10 TABLET ORAL DAILY
Qty: 90 TABLET | Refills: 1 | Status: SHIPPED | OUTPATIENT
Start: 2024-08-20

## 2024-08-20 NOTE — PROGRESS NOTES
Follow Up Office Visit      Patient Name: Vee Garvin  : 1980   MRN: [id number]     Chief Complaint:    Chief Complaint   Patient presents with   • Hypertension     F/u on labs   • Anxiety     Going on since    • Dry Eye     Going on about a month       History of Present Illness: Vee Garvin is a 44 y.o. female who is here today to   follow-up on recent blood work and then also relates that she has had some anxiety issues and hair loss and would like something to just have to take as needed basis is not a daily medicine for her anxiety and stress.    She also has allergies to some dry itchy eyes and the left eye has gotten a little red today on her conjunctiva no visual difficulties-no crusting no eye pain no visual difficulties no foreign body sensation    For her PVCs says she did not like the Toprol so she just stopped it and has discussed with Dr. Lane but he was telling her she may need flecainide but she really does not want the higher risk meds    Her  will be having bariatric surgery on the     Review of Systems   Constitutional: Negative for fatigue and fever.   Respiratory: Negative for cough and shortness of breath.    Cardiovascular: Negative for chest pain and palpitations.   Skin: Negative for rash or itching      Subjective      Review of Systems:   Review of Systems    Past Medical History:   Past Medical History:   Diagnosis Date   • Anemia    • Cat scratch fever    • Hypertension    • Scurvy        Past Surgical History:   Past Surgical History:   Procedure Laterality Date   • KNEE SURGERY         Family History:   Family History   Problem Relation Age of Onset   • Asthma Mother    • Hypertension Mother    • Atrial fibrillation Mother    • Fibroids Mother    • Diabetes Father    • Hypertension Father    • Neuropathy Father    • High cholesterol Father        Social History:   Social History     Socioeconomic History   • Marital status:   "  Tobacco Use   • Smoking status: Never     Passive exposure: Never   • Smokeless tobacco: Never   Vaping Use   • Vaping status: Never Used   Substance and Sexual Activity   • Alcohol use: Not Currently   • Drug use: Never   • Sexual activity: Defer       Medications:     Current Outpatient Medications:   •  valsartan (DIOVAN) 160 MG tablet, Take 1 tablet by mouth Daily., Disp: 90 tablet, Rfl: 3  •  hydrOXYzine pamoate (Vistaril) 25 MG capsule, Take 1 capsule by mouth Every 4 (Four) Hours As Needed for Anxiety., Disp: 90 capsule, Rfl: 1  •  Ketotifen Fumarate (ZADITOR) 0.035 % solution, Administer 1 drop to both eyes 2 (Two) Times a Day. prn, Disp: 5 mL, Rfl: 1  •  loratadine (Claritin) 10 MG tablet, Take 1 tablet by mouth Daily. Prn allergies, Disp: 90 tablet, Rfl: 1  •  metoprolol succinate XL (TOPROL-XL) 50 MG 24 hr tablet, Take 1 tablet by mouth Daily. (Patient not taking: Reported on 8/20/2024), Disp: 30 tablet, Rfl: 11    Allergies:   Allergies   Allergen Reactions   • Demerol [Meperidine] Swelling   • Tramadol Swelling   • Citrus Rash       Objective     Physical Exam:  Vital Signs:   Vitals:    08/20/24 1312   BP: 122/82   BP Location: Left arm   Patient Position: Sitting   Cuff Size: Large Adult   Pulse: 61   Resp: 16   SpO2: 99%   Weight: 123 kg (271 lb)   Height: 160 cm (63\")     Facility age limit for growth %ariel is 20 years.  Body mass index is 48.01 kg/m².     Physical Exam  Vitals and nursing note reviewed.   Constitutional:       Appearance: Normal appearance.   HENT:      Head: Normocephalic and atraumatic.      Nose: Nose normal.      Mouth/Throat:      Mouth: Mucous membranes are moist.      Pharynx: Oropharynx is clear.   Eyes:      Extraocular Movements: Extraocular movements intact.      Pupils: Pupils are equal, round, and reactive to light.      Comments: Left eye conjunctiva injected    Vision is intact both eyes separately at 6 feet to finger count   Cardiovascular:      Rate and Rhythm: " Normal rate and regular rhythm.      Comments: regularly irregular heart rhythm  Pulmonary:      Effort: Pulmonary effort is normal.      Breath sounds: Normal breath sounds.   Abdominal:      Palpations: Abdomen is soft.      Tenderness: There is no abdominal tenderness.   Musculoskeletal:         General: Deformity present. Normal range of motion.      Cervical back: Normal range of motion and neck supple.      Right lower leg: No edema.      Left lower leg: Edema present.   Skin:     General: Skin is warm and dry.   Neurological:      General: No focal deficit present.      Mental Status: She is alert.      Motor: Weakness present.   Psychiatric:         Mood and Affect: Mood normal.         Behavior: Behavior normal.     Procedures    PHQ-9 Total Score:       Assessment / Plan      Assessment/Plan:   Diagnoses and all orders for this visit:    1. ROSALINDA positive (Primary)    2. Primary hypertension    3. Anxiety  -     hydrOXYzine pamoate (Vistaril) 25 MG capsule; Take 1 capsule by mouth Every 4 (Four) Hours As Needed for Anxiety.  Dispense: 90 capsule; Refill: 1    4. Allergic rhinitis, unspecified seasonality, unspecified trigger    Other orders  -     Ketotifen Fumarate (ZADITOR) 0.035 % solution; Administer 1 drop to both eyes 2 (Two) Times a Day. prn  Dispense: 5 mL; Refill: 1  -     loratadine (Claritin) 10 MG tablet; Take 1 tablet by mouth Daily. Prn allergies  Dispense: 90 tablet; Refill: 1    Labs reviewed with her    Regarding her positive ROSALINDA she does have butterfly type rash encouraged her to follow-up with Dr. Stratton in October as directed    Blood pressure doing well on just plain Diovan but she still does have the frequent PVCs she will follow-up with cardiology in that regard I told her maybe it will help taking the metoprolol at night before bedtime    For her anxiety hydroxyzine as directed she knows this may cause a little sedation to be careful /not drive on this medicine    For her allergies  Claritin 10 mg once per day and then Zaditor eyedrops as needed if not better she will return  She does not wear contacts        Follow Up:   Return in about 3 months (around 11/6/2024) for Annual physical.        Patrick Ibarra MD  [facility] [place] [facility]   Portions of note created with Dragon voice recognition technology

## 2024-09-06 ENCOUNTER — OFFICE VISIT (OUTPATIENT)
Dept: FAMILY MEDICINE CLINIC | Facility: CLINIC | Age: 44
End: 2024-09-06
Payer: COMMERCIAL

## 2024-09-06 VITALS
WEIGHT: 271 LBS | RESPIRATION RATE: 15 BRPM | DIASTOLIC BLOOD PRESSURE: 70 MMHG | BODY MASS INDEX: 48.02 KG/M2 | HEIGHT: 63 IN | SYSTOLIC BLOOD PRESSURE: 110 MMHG

## 2024-09-06 DIAGNOSIS — E66.01 MORBID (SEVERE) OBESITY DUE TO EXCESS CALORIES: Primary | ICD-10-CM

## 2024-09-06 PROCEDURE — 1126F AMNT PAIN NOTED NONE PRSNT: CPT | Performed by: FAMILY MEDICINE

## 2024-09-06 PROCEDURE — 3074F SYST BP LT 130 MM HG: CPT | Performed by: FAMILY MEDICINE

## 2024-09-06 PROCEDURE — 99212 OFFICE O/P EST SF 10 MIN: CPT | Performed by: FAMILY MEDICINE

## 2024-09-06 PROCEDURE — 3078F DIAST BP <80 MM HG: CPT | Performed by: FAMILY MEDICINE

## 2024-09-06 PROCEDURE — 1160F RVW MEDS BY RX/DR IN RCRD: CPT | Performed by: FAMILY MEDICINE

## 2024-09-06 PROCEDURE — 1159F MED LIST DOCD IN RCRD: CPT | Performed by: FAMILY MEDICINE

## 2024-09-06 NOTE — PROGRESS NOTES
Follow Up Office Visit      Patient Name: Vee Garvin  : 1980   MRN: [id number]     Chief Complaint:    Chief Complaint   Patient presents with    Weight Loss     Weight loss clinic        History of Present Illness: Vee Garvin is a 44 y.o. female who is here today to   be evaluated for weight loss she would like to go to our weight loss clinic.  Says has been working on her diet her  recently had gastric bariatric surgery she does not want surgery but would like to lose weight via medicines other modalities.  States she is tried cutting back but the weight does not seem to be coming off like she would like.    Subjective      Review of Systems:   Review of Systems    Past Medical History:   Past Medical History:   Diagnosis Date    Anemia     Cat scratch fever     Hypertension     Scurvy        Past Surgical History:   Past Surgical History:   Procedure Laterality Date    KNEE SURGERY         Family History:   Family History   Problem Relation Age of Onset    Asthma Mother     Hypertension Mother     Atrial fibrillation Mother     Fibroids Mother     Arthritis Mother     Hearing loss Mother     Heart disease Mother     Hyperlipidemia Mother     Diabetes Father     Hypertension Father     Neuropathy Father     High cholesterol Father     Heart disease Father     Hyperlipidemia Father     Kidney disease Father     Alcohol abuse Daughter     Anxiety disorder Daughter     Depression Daughter     Drug abuse Daughter     Heart disease Daughter         Bacteria on her heart from her drug abuse    Liver disease Daughter         She has acute HepC from her drug use    Mental illness Daughter     Miscarriages / Stillbirths Daughter     Asthma Son        Social History:   Social History     Socioeconomic History    Marital status:    Tobacco Use    Smoking status: Former     Current packs/day: 0.00     Types: Cigarettes     Quit date: 2000     Years since quittin.2      "Passive exposure: Never    Smokeless tobacco: Never    Tobacco comments:     I barely smoked a pack in a year. I only smoked occasionally   Vaping Use    Vaping status: Never Used   Substance and Sexual Activity    Alcohol use: Never    Drug use: Never    Sexual activity: Yes     Partners: Male     Comment: I only have sex with my        Medications:     Current Outpatient Medications:     hydrOXYzine pamoate (Vistaril) 25 MG capsule, Take 1 capsule by mouth Every 4 (Four) Hours As Needed for Anxiety., Disp: 90 capsule, Rfl: 1    Ketotifen Fumarate (ZADITOR) 0.035 % solution, Administer 1 drop to both eyes 2 (Two) Times a Day. prn, Disp: 5 mL, Rfl: 1    loratadine (Claritin) 10 MG tablet, Take 1 tablet by mouth Daily. Prn allergies, Disp: 90 tablet, Rfl: 1    metoprolol succinate XL (TOPROL-XL) 50 MG 24 hr tablet, Take 1 tablet by mouth Daily., Disp: 30 tablet, Rfl: 11    valsartan (DIOVAN) 160 MG tablet, Take 1 tablet by mouth Daily., Disp: 90 tablet, Rfl: 3    Allergies:   Allergies   Allergen Reactions    Demerol [Meperidine] Swelling    Tramadol Swelling    Citrus Rash       Objective     Physical Exam:  Vital Signs:   Vitals:    09/06/24 0910   BP: 110/70   BP Location: Right arm   Patient Position: Sitting   Cuff Size: Adult   Resp: 15   Weight: 123 kg (271 lb)   Height: 160 cm (63\")     Facility age limit for growth %ariel is 20 years.  Body mass index is 48.01 kg/m².     Physical Exam  Constitutional:       Appearance: She is obese.   Pulmonary:      Effort: Pulmonary effort is normal.   Psychiatric:         Mood and Affect: Mood normal.         Behavior: Behavior normal.         Procedures    PHQ-9 Total Score:       Assessment / Plan      Assessment/Plan:   Diagnoses and all orders for this visit:    1. Morbid (severe) obesity due to excess calories (Primary)  -     Ambulatory Referral to Bariatric Surgery         Referral to weight loss clinic as requested encouraged good diet exercise cutting back " on caloric intake will help with weight loss.         Follow Up:   Return if symptoms worsen or fail to improve.        Patrick Ibarra MD  [facility] [place] [facility]   Portions of note created with Dragon voice recognition technology

## 2024-10-02 ENCOUNTER — OFFICE VISIT (OUTPATIENT)
Age: 44
End: 2024-10-02
Payer: COMMERCIAL

## 2024-10-02 VITALS
BODY MASS INDEX: 45.68 KG/M2 | HEIGHT: 64 IN | WEIGHT: 267.6 LBS | HEART RATE: 78 BPM | SYSTOLIC BLOOD PRESSURE: 132 MMHG | DIASTOLIC BLOOD PRESSURE: 78 MMHG

## 2024-10-02 DIAGNOSIS — E66.01 CLASS 3 SEVERE OBESITY DUE TO EXCESS CALORIES WITH SERIOUS COMORBIDITY AND BODY MASS INDEX (BMI) OF 45.0 TO 49.9 IN ADULT: Primary | ICD-10-CM

## 2024-10-02 DIAGNOSIS — E66.813 CLASS 3 SEVERE OBESITY DUE TO EXCESS CALORIES WITH SERIOUS COMORBIDITY AND BODY MASS INDEX (BMI) OF 45.0 TO 49.9 IN ADULT: Primary | ICD-10-CM

## 2024-10-02 NOTE — PROGRESS NOTES
[place]  [facility]  [address]              [place] [address]      Date: 10/02/2024  Patient Name: Vee Garvin  MRN: [id number]  : 1980    Subjective     Chief Complaint  Obesity Management consult, nutrition counseling       History of Present Illness:  Vee Garvin presents to [facility] for obesity management. Weight gain started after having children, she has 6 children. She is motivated to lose weight to be healthier and more energetic and to be able to be more active with her family.    She goes through episodes of anxiety and becomes anxious about food and looses weight but then when her anxiety subsides she overeats and gains weight back.     Her food weaknesses include food cravings, portion sizes, excess carbohydrates, too little protein and skipping meals. She does not like caffeine, is allergic to citrus and states she tries to stay away from sugar.    Weight history:  Highest lifetime weight: 292 pounds. Today's weight is 121 kg (267 lb 9.6 oz) pounds.   Weight 5 years ago: 188    Current lifestyle:   The following seem to sabotage weight loss efforts:Lack of time for planning & self, enjoyment of food, skipping meals, specific cravings like carbohydrates, mindless eating (snacking while working or watching TV), always hungry, boredom eating, portion sizes, too little protein, and poor sleep    Past diets: none  Past weight loss medications: none    Typical Diet:  Breakfast: cereal with 2% milk and banana  Lunch: skip  Dinner: pasta or chicken  Snacks: chips and sour cream  Beverages: water (40 oz/day), milk (40 oz/day)    Pertinent medical history:  Pancreatitis: no  Glaucoma: no  Headaches: yes  HTN: yes  Heart palpitations: yes  Thyroid C cell cancer personal or family hx: no  MEN syndrome personal or family hx: no  Nephrolithiasis: yes    PHQ-9 Total Score: 1           Review of Systems   Constitutional:   "Positive for fatigue and unexpected weight gain.        Positive for weight gain   HENT:  Negative for trouble swallowing.         Negative for throat swelling   Respiratory:  Negative for shortness of breath and wheezing.         Negative for snoring   Cardiovascular:  Positive for palpitations. Negative for chest pain and leg swelling.   Gastrointestinal:  Positive for constipation, GERD and indigestion. Negative for abdominal pain and diarrhea.   Endocrine: Positive for cold intolerance, heat intolerance, polydipsia and polyphagia. Negative for polyuria.        Negative for loss of hair  Negative for hirsutism     Genitourinary:  Positive for menstrual problem.        Denies menstrual irregularities   Musculoskeletal:  Positive for arthralgias.        Denies exercise limitations  Denies chronic pain   Skin:  Positive for dry skin.        Negative for acne   Neurological:  Positive for numbness. Negative for headache and memory problem.        Negative for paresthesias   Psychiatric/Behavioral:  Positive for sleep disturbance. Negative for self-injury, suicidal ideas and depressed mood. The patient is nervous/anxious.    All other systems reviewed and are negative.        Objective     Body mass index is 45.93 kg/m².   Body composition analysis completed and showed:   Body Fat %: 52.5     Measurements (in inches)  Measurements (in inches) Waist Circumference: 45   Neck: 17  Chest: 48  Hips: 51.5  Thighs: 43    Vital Signs:   /78 (BP Location: Right arm, Patient Position: Sitting)   Pulse 78   Ht 162.6 cm (64\")   Wt 121 kg (267 lb 9.6 oz)   BMI 45.93 kg/m²     Physical Exam  Constitutional:       Appearance: Normal appearance.   HENT:      Head: Normocephalic and atraumatic.   Pulmonary:      Effort: Pulmonary effort is normal.   Neurological:      Mental Status: She is alert and oriented to person, place, and time.   Psychiatric:         Mood and Affect: Mood normal.         Thought Content: Thought " content normal.          Result Review :     Common labs          5/10/2024    09:38 6/13/2024    08:05 6/24/2024    11:56   Common Labs   Glucose  90     BUN  16     Creatinine  0.68     Sodium  137     Potassium  4.1     Chloride  102     Calcium  9.2     Total Protein  7.0     Albumin  4.2     Total Bilirubin  0.9     Alkaline Phosphatase  54     AST (SGOT)  17     ALT (SGPT)  19     WBC   8.7    Hemoglobin   13.3    Hematocrit   39.7    Platelets   270    Total Cholesterol 174      Triglycerides 57      HDL Cholesterol 48      LDL Cholesterol  115      Uric Acid   5.0                  Assessment / Plan       Diagnoses and all orders for this visit:    1. Class 3 severe obesity due to excess calories with serious comorbidity and body mass index (BMI) of 45.0 to 49.9 in adult (Primary)  Assessment & Plan:  Patient's (Body mass index is 45.93 kg/m².) indicates that they are morbidly/severely obese (BMI > 40 or > 35 with obesity - related health condition) with health conditions that include hypertension, GERD, and osteoarthritis . Weight is newly identified. BMI  is above average; BMI management plan is completed. We discussed low calorie, low carb based diet program, portion control, increasing exercise, and an bro-based approach such as [employer] Pal or Lose It.       -- This is an initial visit. Topics of discussion included obesity as a disease, nutritional education on food groups, exercise, and medications.Patient's past medical history was reviewed in detail and barriers to weight loss were identified and discussed. Past efforts at weight reduction on their own as well as under physician supervision were documented and discussed.  I advised patient to continue routine care with their Primary Care Provider.  --Body composition analysis was reviewed. Short and long-term weight loss goals set up based on this information.  --Patient was instructed on adequate protein, controlled carb and controlled fat intake.  [employer] food journal set up with calorie and macro goals set : calories - 1600 or less, protein - 100 g/day or more , net carbs - 75 g/day or less  .  Patient encouraged to start journaling daily.  Encouraged that we are not necessarily looking for perfection but starting to learn where calories and macros are staying.  Patient advised that were looking to stay at or below calorie and carbohydrate levels and at or above protein and fiber levels. Asked patient to bring in food journal to next office visit for brief review  --Patient is to try nutritonal/behavioral changes only first.  --Fasting labs ordered        Orders:  -     CBC & Differential  -     Comprehensive Metabolic Panel  -     Hemoglobin A1c  -     Insulin, Total  -     Lipid Panel  -     TSH  -     T4, Free  -     Cancel: Urine Drug Screen - Urine, Clean Catch  -     Vitamin D,25-Hydroxy        I spent 68 minutes caring for Vee on this date of service. This time includes time spent by me in the following activities:preparing for the visit, reviewing tests, performing a medically appropriate examination and/or evaluation , counseling and educating the patient/family/caregiver, ordering medications, tests, or procedures, and documenting information in the medical record  Follow Up   Return in about 2 weeks (around 10/16/2024).  Patient was given instructions and counseling regarding her condition or for health maintenance advice. Please see specific information pulled into the AVS if appropriate.     James Estrella, APRN  10/02/2024

## 2024-10-02 NOTE — ASSESSMENT & PLAN NOTE
Patient's (Body mass index is 45.93 kg/m².) indicates that they are morbidly/severely obese (BMI > 40 or > 35 with obesity - related health condition) with health conditions that include hypertension, GERD, and osteoarthritis . Weight is newly identified. BMI  is above average; BMI management plan is completed. We discussed low calorie, low carb based diet program, portion control, increasing exercise, and an bro-based approach such as [employer] Pal or Lose It.       -- This is an initial visit. Topics of discussion included obesity as a disease, nutritional education on food groups, exercise, and medications.Patient's past medical history was reviewed in detail and barriers to weight loss were identified and discussed. Past efforts at weight reduction on their own as well as under physician supervision were documented and discussed.  I advised patient to continue routine care with their Primary Care Provider.  --Body composition analysis was reviewed. Short and long-term weight loss goals set up based on this information.  --Patient was instructed on adequate protein, controlled carb and controlled fat intake. [employer] food journal set up with calorie and macro goals set : calories - 1600 or less, protein - 100 g/day or more , net carbs - 75 g/day or less  .  Patient encouraged to start journaling daily.  Encouraged that we are not necessarily looking for perfection but starting to learn where calories and macros are staying.  Patient advised that were looking to stay at or below calorie and carbohydrate levels and at or above protein and fiber levels. Asked patient to bring in food journal to next office visit for brief review  --Patient is to try nutritonal/behavioral changes only first.  --Fasting labs ordered

## 2024-10-07 ENCOUNTER — LAB (OUTPATIENT)
Dept: FAMILY MEDICINE CLINIC | Facility: CLINIC | Age: 44
End: 2024-10-07
Payer: COMMERCIAL

## 2024-10-08 LAB
25(OH)D3+25(OH)D2 SERPL-MCNC: 18.4 NG/ML (ref 30–100)
ALBUMIN SERPL-MCNC: 4.3 G/DL (ref 3.9–4.9)
ALP SERPL-CCNC: 55 IU/L (ref 44–121)
ALT SERPL-CCNC: 21 IU/L (ref 0–32)
AST SERPL-CCNC: 22 IU/L (ref 0–40)
BASOPHILS # BLD AUTO: 0.1 X10E3/UL (ref 0–0.2)
BASOPHILS NFR BLD AUTO: 1 %
BILIRUB SERPL-MCNC: 1.5 MG/DL (ref 0–1.2)
BUN SERPL-MCNC: 16 MG/DL (ref 6–24)
BUN/CREAT SERPL: 21 (ref 9–23)
CALCIUM SERPL-MCNC: 9.1 MG/DL (ref 8.7–10.2)
CHLORIDE SERPL-SCNC: 103 MMOL/L (ref 96–106)
CHOLEST SERPL-MCNC: 175 MG/DL (ref 100–199)
CO2 SERPL-SCNC: 21 MMOL/L (ref 20–29)
CREAT SERPL-MCNC: 0.77 MG/DL (ref 0.57–1)
EGFRCR SERPLBLD CKD-EPI 2021: 97 ML/MIN/1.73
EOSINOPHIL # BLD AUTO: 0.5 X10E3/UL (ref 0–0.4)
EOSINOPHIL NFR BLD AUTO: 6 %
ERYTHROCYTE [DISTWIDTH] IN BLOOD BY AUTOMATED COUNT: 12 % (ref 11.7–15.4)
GLOBULIN SER CALC-MCNC: 3 G/DL (ref 1.5–4.5)
GLUCOSE SERPL-MCNC: 87 MG/DL (ref 70–99)
HBA1C MFR BLD: 5.2 % (ref 4.8–5.6)
HCT VFR BLD AUTO: 43.9 % (ref 34–46.6)
HDLC SERPL-MCNC: 38 MG/DL
HGB BLD-MCNC: 14.5 G/DL (ref 11.1–15.9)
IMM GRANULOCYTES # BLD AUTO: 0 X10E3/UL (ref 0–0.1)
IMM GRANULOCYTES NFR BLD AUTO: 0 %
INSULIN SERPL-ACNC: 13.3 UIU/ML (ref 2.6–24.9)
LDLC SERPL CALC-MCNC: 123 MG/DL (ref 0–99)
LYMPHOCYTES # BLD AUTO: 1.9 X10E3/UL (ref 0.7–3.1)
LYMPHOCYTES NFR BLD AUTO: 22 %
MCH RBC QN AUTO: 31.3 PG (ref 26.6–33)
MCHC RBC AUTO-ENTMCNC: 33 G/DL (ref 31.5–35.7)
MCV RBC AUTO: 95 FL (ref 79–97)
MONOCYTES # BLD AUTO: 0.5 X10E3/UL (ref 0.1–0.9)
MONOCYTES NFR BLD AUTO: 5 %
NEUTROPHILS # BLD AUTO: 5.8 X10E3/UL (ref 1.4–7)
NEUTROPHILS NFR BLD AUTO: 66 %
PLATELET # BLD AUTO: 281 X10E3/UL (ref 150–450)
POTASSIUM SERPL-SCNC: 3.7 MMOL/L (ref 3.5–5.2)
PROT SERPL-MCNC: 7.3 G/DL (ref 6–8.5)
RBC # BLD AUTO: 4.64 X10E6/UL (ref 3.77–5.28)
SODIUM SERPL-SCNC: 139 MMOL/L (ref 134–144)
T4 FREE SERPL-MCNC: 1.14 NG/DL (ref 0.82–1.77)
TRIGL SERPL-MCNC: 72 MG/DL (ref 0–149)
TSH SERPL DL<=0.005 MIU/L-ACNC: 3.24 UIU/ML (ref 0.45–4.5)
VLDLC SERPL CALC-MCNC: 14 MG/DL (ref 5–40)
WBC # BLD AUTO: 8.8 X10E3/UL (ref 3.4–10.8)

## 2024-10-09 ENCOUNTER — PATIENT ROUNDING (BHMG ONLY) (OUTPATIENT)
Dept: BARIATRICS/WEIGHT MGMT | Facility: CLINIC | Age: 44
End: 2024-10-09
Payer: COMMERCIAL

## 2024-10-09 NOTE — PROGRESS NOTES
A [employer] message has been sent to the patient for PATIENT ROUNDING for [facility].

## 2024-10-11 RX ORDER — ERGOCALCIFEROL 1.25 MG/1
50000 CAPSULE, LIQUID FILLED ORAL WEEKLY
Qty: 12 CAPSULE | Refills: 0 | Status: SHIPPED | OUTPATIENT
Start: 2024-10-11 | End: 2025-01-09

## 2024-10-22 ENCOUNTER — OFFICE VISIT (OUTPATIENT)
Age: 44
End: 2024-10-22
Payer: COMMERCIAL

## 2024-10-22 VITALS
HEIGHT: 64 IN | HEART RATE: 72 BPM | SYSTOLIC BLOOD PRESSURE: 108 MMHG | WEIGHT: 258.2 LBS | DIASTOLIC BLOOD PRESSURE: 72 MMHG | BODY MASS INDEX: 44.08 KG/M2

## 2024-10-22 DIAGNOSIS — E66.813 CLASS 3 SEVERE OBESITY DUE TO EXCESS CALORIES WITH SERIOUS COMORBIDITY AND BODY MASS INDEX (BMI) OF 40.0 TO 44.9 IN ADULT: Primary | ICD-10-CM

## 2024-10-22 DIAGNOSIS — E66.01 CLASS 3 SEVERE OBESITY DUE TO EXCESS CALORIES WITH SERIOUS COMORBIDITY AND BODY MASS INDEX (BMI) OF 40.0 TO 44.9 IN ADULT: Primary | ICD-10-CM

## 2024-10-22 DIAGNOSIS — K59.09 OTHER CONSTIPATION: ICD-10-CM

## 2024-10-22 PROCEDURE — 1159F MED LIST DOCD IN RCRD: CPT | Performed by: NURSE PRACTITIONER

## 2024-10-22 PROCEDURE — 1160F RVW MEDS BY RX/DR IN RCRD: CPT | Performed by: NURSE PRACTITIONER

## 2024-10-22 PROCEDURE — 99213 OFFICE O/P EST LOW 20 MIN: CPT | Performed by: NURSE PRACTITIONER

## 2024-10-22 PROCEDURE — 3078F DIAST BP <80 MM HG: CPT | Performed by: NURSE PRACTITIONER

## 2024-10-22 PROCEDURE — 3074F SYST BP LT 130 MM HG: CPT | Performed by: NURSE PRACTITIONER

## 2024-10-22 NOTE — ASSESSMENT & PLAN NOTE
Patient's (Body mass index is 44.32 kg/m².) indicates that they are morbidly/severely obese (BMI > 40 or > 35 with obesity - related health condition) with health conditions that include hypertension, GERD, and osteoarthritis . Weight is improving with lifestyle modifications. BMI  is above average; BMI management plan is completed. We discussed low calorie, low carb based diet program, portion control, increasing exercise, and an bro-based approach such as [employer] Pal or Lose It.          The current plan for this month includes:   - Add resistance training  - Weight loss goal 4-6lbs this month  - Continue to work on lifestyle behavioral changes  - Continue nutrition focus

## 2024-10-22 NOTE — PROGRESS NOTES
[facility]  [address] [place] [address]    Office Note Follow Up      Date: 10/22/2024  Patient Name: Vee Garvin  MRN: [id number]  : 1980    Subjective     Chief Complaint  Obesity Management follow-up    Vee Garvin presents to [facility] for obesity management.     Patient is satisfied with weight loss progress. Appetite is well controlled. Nutrition focus only, taking no meds from [facility]. She is having problems with constipation. The patient is taking multivitamin and is not taking fish oil.  The patient is using a food journal.    Average daily calories: 1150  Average daily protein: 93  Average daily net carbs: 66    The patient is exercising with a FITT score of:    Frequency Intensity Time Strength Training   []   0, none []   0 []   0 [x]   0   []   1 (1-2x/week) [x]   1 (light) []   1 (<10 min) []   1 (1x/week)   []   2 (3-5x/week) []   2 (moderate) []   2 (10-20 min) []   2 (2x/week)   [x]   3 (daily) []   3 (moderately hard)  []   4 (very hard) []   3 (20-30 min)  [x]   4 (>30 min) []   3 (3-4x/week)     Review of Systems   Constitutional:  Negative for appetite change and fatigue.   Eyes:  Negative for visual disturbance.   Cardiovascular:  Negative for chest pain and palpitations.   Gastrointestinal:  Negative for constipation and indigestion.   Neurological:  Negative for light-headedness.   All other systems reviewed and are negative.      Objective   Start weight: 267.6 pounds.    Total Loss lb/%Loss of beginning body weight (BBW): -9.4 lb/-3.51 %  Change in weight since last visit: -9.4    Recent Weight History:   Wt Readings from Last 6 Encounters:   10/22/24 117 kg (258 lb 3.2 oz)   10/02/24 121 kg (267 lb 9.6 oz)   24 123 kg (271 lb)   24 123 kg (271 lb)   24 122 kg (269 lb)   24 123 kg (272 lb)         Body mass index is 44.32 kg/m².   Body composition analysis completed and  "showed:   Body Fat %: 52.1    Measurements (in inches)  Waist Circumference: 44    Vital Signs:   /72 (BP Location: Left arm, Patient Position: Sitting)   Pulse 72   Ht 162.6 cm (64\")   Wt 117 kg (258 lb 3.2 oz)   BMI 44.32 kg/m²       Physical Exam  Constitutional:       Appearance: Normal appearance.   HENT:      Head: Normocephalic and atraumatic.   Pulmonary:      Effort: Pulmonary effort is normal.   Neurological:      Mental Status: She is alert and oriented to person, place, and time.   Psychiatric:         Mood and Affect: Mood normal.         Thought Content: Thought content normal.                 Assessment / Plan        Diagnoses and all orders for this visit:    1. Class 3 severe obesity due to excess calories with serious comorbidity and body mass index (BMI) of 40.0 to 44.9 in adult (Primary)  Assessment & Plan:  Patient's (Body mass index is 44.32 kg/m².) indicates that they are morbidly/severely obese (BMI > 40 or > 35 with obesity - related health condition) with health conditions that include hypertension, GERD, and osteoarthritis . Weight is improving with lifestyle modifications. BMI  is above average; BMI management plan is completed. We discussed low calorie, low carb based diet program, portion control, increasing exercise, and an bro-based approach such as [employer] Pal or Lose It.          The current plan for this month includes:   - Add resistance training  - Weight loss goal 4-6lbs this month  - Continue to work on lifestyle behavioral changes  - Continue nutrition focus        2. Other constipation  Assessment & Plan:  -Miralax 17 grams daily          We discussed the risks, benefits, and limitations of treatments. Continue medications and OTC supplements as discussed. Patient verbalizes understanding of and agreement with management plan.     Follow Up   Return in about 4 weeks (around 11/19/2024).  Patient was given instructions and counseling regarding her condition or for " health maintenance advice. Please see specific information pulled into the AVS if appropriate.     I spent 24 minutes on this date of service. This time includes time spent by me in the following activities:preparing for the visit, counseling and educating the patient/family/caregiver, ordering medications, tests, or procedures and documenting information in the medical record.    James Estrella, APRN  10/22/2024

## 2024-10-24 ENCOUNTER — LAB (OUTPATIENT)
Facility: HOSPITAL | Age: 44
End: 2024-10-24
Payer: COMMERCIAL

## 2024-10-24 ENCOUNTER — OFFICE VISIT (OUTPATIENT)
Age: 44
End: 2024-10-24
Payer: COMMERCIAL

## 2024-10-24 VITALS
SYSTOLIC BLOOD PRESSURE: 122 MMHG | TEMPERATURE: 97.9 F | BODY MASS INDEX: 43.87 KG/M2 | HEART RATE: 74 BPM | DIASTOLIC BLOOD PRESSURE: 90 MMHG | WEIGHT: 257 LBS | HEIGHT: 64 IN

## 2024-10-24 DIAGNOSIS — M25.50 ARTHRALGIA, UNSPECIFIED JOINT: ICD-10-CM

## 2024-10-24 DIAGNOSIS — R53.83 FATIGUE, UNSPECIFIED TYPE: ICD-10-CM

## 2024-10-24 DIAGNOSIS — R76.8 ANA POSITIVE: ICD-10-CM

## 2024-10-24 DIAGNOSIS — R76.8 ANA POSITIVE: Primary | ICD-10-CM

## 2024-10-24 LAB
BASOPHILS # BLD AUTO: 0.05 10*3/MM3 (ref 0–0.2)
BASOPHILS NFR BLD AUTO: 0.6 % (ref 0–1.5)
DEPRECATED RDW RBC AUTO: 39.8 FL (ref 37–54)
EOSINOPHIL # BLD AUTO: 0.45 10*3/MM3 (ref 0–0.4)
EOSINOPHIL NFR BLD AUTO: 5.3 % (ref 0.3–6.2)
ERYTHROCYTE [DISTWIDTH] IN BLOOD BY AUTOMATED COUNT: 11.9 % (ref 12.3–15.4)
HCT VFR BLD AUTO: 40.5 % (ref 34–46.6)
HGB BLD-MCNC: 13.6 G/DL (ref 12–15.9)
IMM GRANULOCYTES # BLD AUTO: 0.03 10*3/MM3 (ref 0–0.05)
IMM GRANULOCYTES NFR BLD AUTO: 0.4 % (ref 0–0.5)
LYMPHOCYTES # BLD AUTO: 1.68 10*3/MM3 (ref 0.7–3.1)
LYMPHOCYTES NFR BLD AUTO: 19.6 % (ref 19.6–45.3)
MCH RBC QN AUTO: 30.8 PG (ref 26.6–33)
MCHC RBC AUTO-ENTMCNC: 33.6 G/DL (ref 31.5–35.7)
MCV RBC AUTO: 91.6 FL (ref 79–97)
MONOCYTES # BLD AUTO: 0.42 10*3/MM3 (ref 0.1–0.9)
MONOCYTES NFR BLD AUTO: 4.9 % (ref 5–12)
NEUTROPHILS NFR BLD AUTO: 5.93 10*3/MM3 (ref 1.7–7)
NEUTROPHILS NFR BLD AUTO: 69.2 % (ref 42.7–76)
NRBC BLD AUTO-RTO: 0 /100 WBC (ref 0–0.2)
PLATELET # BLD AUTO: 307 10*3/MM3 (ref 140–450)
PMV BLD AUTO: 10 FL (ref 6–12)
RBC # BLD AUTO: 4.42 10*6/MM3 (ref 3.77–5.28)
WBC NRBC COR # BLD AUTO: 8.56 10*3/MM3 (ref 3.4–10.8)

## 2024-10-24 PROCEDURE — 80074 ACUTE HEPATITIS PANEL: CPT

## 2024-10-24 PROCEDURE — 86037 ANCA TITER EACH ANTIBODY: CPT

## 2024-10-24 PROCEDURE — 36415 COLL VENOUS BLD VENIPUNCTURE: CPT

## 2024-10-24 PROCEDURE — 85613 RUSSELL VIPER VENOM DILUTED: CPT

## 2024-10-24 PROCEDURE — 86431 RHEUMATOID FACTOR QUANT: CPT

## 2024-10-24 PROCEDURE — 85598 HEXAGNAL PHOSPH PLTLT NEUTRL: CPT

## 2024-10-24 PROCEDURE — 85732 THROMBOPLASTIN TIME PARTIAL: CPT

## 2024-10-24 PROCEDURE — 85670 THROMBIN TIME PLASMA: CPT

## 2024-10-24 PROCEDURE — 80053 COMPREHEN METABOLIC PANEL: CPT

## 2024-10-24 PROCEDURE — 85610 PROTHROMBIN TIME: CPT

## 2024-10-24 PROCEDURE — 86147 CARDIOLIPIN ANTIBODY EA IG: CPT

## 2024-10-24 PROCEDURE — 86480 TB TEST CELL IMMUN MEASURE: CPT

## 2024-10-24 PROCEDURE — 86146 BETA-2 GLYCOPROTEIN ANTIBODY: CPT

## 2024-10-24 PROCEDURE — 86038 ANTINUCLEAR ANTIBODIES: CPT

## 2024-10-24 PROCEDURE — 86140 C-REACTIVE PROTEIN: CPT

## 2024-10-24 PROCEDURE — 82550 ASSAY OF CK (CPK): CPT

## 2024-10-24 PROCEDURE — 82085 ASSAY OF ALDOLASE: CPT

## 2024-10-24 PROCEDURE — 85025 COMPLETE CBC W/AUTO DIFF WBC: CPT

## 2024-10-24 PROCEDURE — 86800 THYROGLOBULIN ANTIBODY: CPT

## 2024-10-24 PROCEDURE — 84443 ASSAY THYROID STIM HORMONE: CPT

## 2024-10-24 PROCEDURE — 85730 THROMBOPLASTIN TIME PARTIAL: CPT

## 2024-10-24 PROCEDURE — 81374 HLA I TYPING 1 ANTIGEN LR: CPT

## 2024-10-24 PROCEDURE — 83516 IMMUNOASSAY NONANTIBODY: CPT

## 2024-10-24 PROCEDURE — 84439 ASSAY OF FREE THYROXINE: CPT

## 2024-10-24 PROCEDURE — 83520 IMMUNOASSAY QUANT NOS NONAB: CPT

## 2024-10-24 PROCEDURE — 85652 RBC SED RATE AUTOMATED: CPT

## 2024-10-24 PROCEDURE — 86376 MICROSOMAL ANTIBODY EACH: CPT

## 2024-10-25 LAB
ALBUMIN SERPL-MCNC: 4.2 G/DL (ref 3.5–5.2)
ALBUMIN/GLOB SERPL: 1.2 G/DL
ALP SERPL-CCNC: 46 U/L (ref 39–117)
ALT SERPL W P-5'-P-CCNC: 18 U/L (ref 1–33)
ANION GAP SERPL CALCULATED.3IONS-SCNC: 13.5 MMOL/L (ref 5–15)
AST SERPL-CCNC: 14 U/L (ref 1–32)
BILIRUB SERPL-MCNC: 1 MG/DL (ref 0–1.2)
BUN SERPL-MCNC: 16 MG/DL (ref 6–20)
BUN/CREAT SERPL: 20.3 (ref 7–25)
CALCIUM SPEC-SCNC: 9.3 MG/DL (ref 8.6–10.5)
CHLORIDE SERPL-SCNC: 101 MMOL/L (ref 98–107)
CK SERPL-CCNC: 118 U/L (ref 20–180)
CO2 SERPL-SCNC: 24.5 MMOL/L (ref 22–29)
CREAT SERPL-MCNC: 0.79 MG/DL (ref 0.57–1)
CRP SERPL-MCNC: 1.09 MG/DL (ref 0–0.5)
EGFRCR SERPLBLD CKD-EPI 2021: 94.7 ML/MIN/1.73
ERYTHROCYTE [SEDIMENTATION RATE] IN BLOOD: 19 MM/HR (ref 0–20)
GLOBULIN UR ELPH-MCNC: 3.5 GM/DL
GLUCOSE SERPL-MCNC: 86 MG/DL (ref 65–99)
HAV IGM SERPL QL IA: NORMAL
HBV CORE IGM SERPL QL IA: NORMAL
HBV SURFACE AG SERPL QL IA: NORMAL
HCV AB SER QL: NORMAL
POTASSIUM SERPL-SCNC: 3.4 MMOL/L (ref 3.5–5.2)
PROT SERPL-MCNC: 7.7 G/DL (ref 6–8.5)
SODIUM SERPL-SCNC: 139 MMOL/L (ref 136–145)
T4 FREE SERPL-MCNC: 1.32 NG/DL (ref 0.92–1.68)
TSH SERPL DL<=0.05 MIU/L-ACNC: 2.47 UIU/ML (ref 0.27–4.2)

## 2024-10-28 ENCOUNTER — PATIENT ROUNDING (BHMG ONLY) (OUTPATIENT)
Dept: BARIATRICS/WEIGHT MGMT | Facility: CLINIC | Age: 44
End: 2024-10-28
Payer: COMMERCIAL

## 2024-10-28 LAB
ALDOLASE SERPL-CCNC: 6.2 U/L (ref 3.3–10.3)
C-ANCA TITR SER IF: NORMAL TITER
MYELOPEROXIDASE AB SER IA-ACNC: <0.2 UNITS (ref 0–0.9)
P-ANCA ATYPICAL TITR SER IF: NORMAL TITER
P-ANCA TITR SER IF: NORMAL TITER
PROTEINASE3 AB SER IA-ACNC: <0.2 UNITS (ref 0–0.9)
THYROGLOB AB SERPL-ACNC: <1 IU/ML (ref 0–0.9)
THYROPEROXIDASE AB SERPL-ACNC: 17 IU/ML (ref 0–34)

## 2024-10-28 NOTE — PROGRESS NOTES
A [employer] message has been sent to the patient for PATIENT ROUNDING for [facility].

## 2024-10-29 LAB
GAMMA INTERFERON BACKGROUND BLD IA-ACNC: 0.02 IU/ML
M TB IFN-G BLD-IMP: NEGATIVE
M TB IFN-G CD4+ BCKGRND COR BLD-ACNC: 0.02 IU/ML
M TB IFN-G CD4+CD8+ BCKGRND COR BLD-ACNC: 0.02 IU/ML
MITOGEN IGNF BCKGRD COR BLD-ACNC: >10 IU/ML
QUANTIFERON INCUBATION: NORMAL
SERVICE CMNT-IMP: NORMAL

## 2024-10-31 LAB
APTT HEX PL PPP: 1 SEC
APTT IMM NP PPP: NORMAL SEC
APTT PPP 1:1 SALINE: NORMAL SEC
APTT PPP: 29.4 SEC
B2 GLYCOPROT1 IGA SER-ACNC: <10 SAU
B2 GLYCOPROT1 IGG SER-ACNC: <10 SGU
B2 GLYCOPROT1 IGM SER-ACNC: 14 SMU
CARDIOLIPIN IGG SER IA-ACNC: <10 GPL
CARDIOLIPIN IGM SER IA-ACNC: <10 MPL
CONFIRM DRVVT: NORMAL SEC
DRVVT SCREEN TO CONFIRM RATIO: NORMAL RATIO
INR PPP: 1 RATIO
LABORATORY COMMENT REPORT: NORMAL
PROTHROMBIN TIME: 11.5 SEC
SCREEN DRVVT: 33.6 SEC
THROMBIN TIME: 17.3 SEC

## 2024-11-01 LAB — HLA-B27 QL NAA+PROBE: NEGATIVE

## 2024-11-05 LAB — 14-3-3 ETA AG SER IA-MCNC: <0.2 NG/ML

## 2024-11-11 LAB
RF IGA SER-ACNC: <7 U
RF IGG SER-ACNC: <7 U
RF IGM SER IA-ACNC: <7 U

## 2024-11-16 LAB
ANA PLUS 12 INTERPRETATION: ABNORMAL
ANA SER QL IF: POSITIVE
ANA SPECKLED TITR SER: ABNORMAL {TITER}
C3 SERPL-MCNC: 163 MG/DL (ref 82–167)
C4 SERPL-MCNC: 25 MG/DL (ref 14–44)
CARDIOLIPIN IGA SER IA-ACNC: <12 APL U/ML
CARDIOLIPIN IGG SER IA-ACNC: <15 GPL U/ML
CARDIOLIPIN IGM SER IA-ACNC: <13 MPL U/ML
CCP IGA+IGG SERPL IA-ACNC: <20 UNITS
CENTROMERE AB TITR SER IF: ABNORMAL {TITER}
CHROMATIN IGG SERPL-ACNC: <20 UNITS
DSDNA AB SER FARR-ACNC: <8 IU/ML
ENA SCL70 AB SER IA-ACNC: <20 UNITS
ENA SM AB SER-ACNC: <20 UNITS
ENA SS-A AB SER IA-ACNC: <20 UNITS
ENA SS-B AB SER IA-ACNC: <20 UNITS
LABORATORY COMMENT REPORT: ABNORMAL
RHEUMATOID FACT SERPL-ACNC: <14 IU/ML
THYROPEROXIDASE AB SERPL-ACNC: <9 IU/ML
U1 SNRNP AB SER IA-ACNC: <20 UNITS

## 2024-11-20 ENCOUNTER — OFFICE VISIT (OUTPATIENT)
Dept: FAMILY MEDICINE CLINIC | Facility: CLINIC | Age: 44
End: 2024-11-20
Payer: COMMERCIAL

## 2024-11-20 VITALS
DIASTOLIC BLOOD PRESSURE: 72 MMHG | SYSTOLIC BLOOD PRESSURE: 110 MMHG | OXYGEN SATURATION: 97 % | HEART RATE: 72 BPM | BODY MASS INDEX: 43.36 KG/M2 | WEIGHT: 254 LBS | HEIGHT: 64 IN

## 2024-11-20 DIAGNOSIS — M54.9 UPPER BACK PAIN ON RIGHT SIDE: ICD-10-CM

## 2024-11-20 DIAGNOSIS — Z12.31 BREAST CANCER SCREENING BY MAMMOGRAM: ICD-10-CM

## 2024-11-20 DIAGNOSIS — Z79.899 HIGH RISK MEDICATION USE: ICD-10-CM

## 2024-11-20 DIAGNOSIS — S46.811D STRAIN OF RIGHT TRAPEZIUS MUSCLE, SUBSEQUENT ENCOUNTER: ICD-10-CM

## 2024-11-20 DIAGNOSIS — Z00.00 ANNUAL PHYSICAL EXAM: Primary | ICD-10-CM

## 2024-11-20 RX ORDER — KETOROLAC TROMETHAMINE 10 MG/1
10 TABLET, FILM COATED ORAL EVERY 6 HOURS PRN
COMMUNITY
End: 2024-11-20

## 2024-11-20 RX ORDER — METHOCARBAMOL 500 MG/1
500 TABLET, FILM COATED ORAL 4 TIMES DAILY
COMMUNITY

## 2024-11-20 RX ORDER — CELECOXIB 100 MG/1
100 CAPSULE ORAL 2 TIMES DAILY PRN
Qty: 60 CAPSULE | Refills: 0 | Status: SHIPPED | OUTPATIENT
Start: 2024-11-20

## 2024-11-20 RX ORDER — HYDROXYZINE PAMOATE 25 MG/1
25 CAPSULE ORAL 3 TIMES DAILY PRN
COMMUNITY

## 2024-11-20 NOTE — PROGRESS NOTES
Female Physical Note      Patient Name: Vee Garvin  : 1980   MRN: [id number]     Chief Complaint:    Chief Complaint   Patient presents with    Annual Exam       History of Present Illness: Vee Garvin is a 44 y.o. female who is here today for their annual health maintenance and physical.  She also here to follow-up on some right upper back trapezius pain she had past couple days and up in the ER after visiting the chiropractor for 2 different visits.  Please see ER notes and CTA and right rib x-ray were negative    History of Present Illness  The patient is a 44-year-old female who comes in today for a follow-up physical checkup. She is accompanied by an adult male.    She reports experiencing sudden pain on Thursday morning while reading. The pain, described as sharp and knife-like, originated in her r upper back and extended into her neck, shoulder blade, and chest, causing discomfort when breathing. Despite resting, the pain persisted into Friday, prompting her to seek chiropractic treatment. The chiropractor suggested a possible slipped rib and attempted an adjustment, which resulted in a significant popping sound and increased pain. A follow-up visit to the chiropractor revealed tense muscles, leading to a recommendation for medical evaluation and potential muscle relaxant use.    Due to the severity of the pain, she visited the ER yesterday afternoon. A chest x-ray and blood work were performed. The x-ray was clear, but the blood work showed elevated D-dimer levels, leading to a CT scan with contrast, which also returned clear results. The ER staff suggested the pain was muscular and prescribed Toradol and a muscle relaxer. She received pain medication four hours later, which provided some relief. She has not taken the prescribed medications today due to uncertainty about their effects. She has been avoiding NSAIDs due to a previous duodenal erosion and experienced severe stomach  pain after returning home from the ER.    She reports no ear, nose, or throat issues and cannot recall any specific incident that might have caused the pain. She was performing wall push-ups prior to the onset of the pain. She reports no rashes, nausea, vomiting, diarrhea, blood in stool, vomiting blood, coughing up blood, dark black bowel movements, or UTI symptoms. She has not taken Celebrex before.    She has not had a mammogram and wishes to schedule one.    She visited James at the weight loss clinic to check her thyroid to ensure it was balanced and not affecting her weight loss. She was set up with rheumatology and underwent numerous tests. She stopped taking Valsartan because she felt her blood pressure was good. After being outside for her kids' cross-country practice, she felt unwell due to the heat and experienced difficulty breathing, seeing spots, and feeling very sick. She had not taken Valsartan for a couple of weeks before that incident. Her blood pressure readings were 107/60 and later 115/50. She plans to monitor her blood pressure without taking Valsartan.    She has not taken Vistaril but was given it for anxiety if needed. She reports doing well with anxiety and never started escitalopram 5 mg. She takes Claritin as needed and is currently taking vitamin D supplements, a multivitamin, and a probiotic.  Review of Systems negative other than the above right upper back/neck /chest pain noted  Constitutional: Negative for fatigue and fever.   HENT: Negative for ear pain and sore throat.    Eyes: Negative for visual disturbance.   Respiratory: Negative for cough, chest tightness and shortness of breath.    Cardiovascular: Negative for chest pain and palpitations.   Gastrointestinal: Negative for abdominal pain, blood in stool, melena, constipation, diarrhea, nausea and vomiting.   Endocrine: Negative for cold intolerance and heat intolerance.   Genitourinary: Negative for dysuria and hematuria.    Musculoskeletal: Negative for back pain and joint swelling.   Skin: Negative for rash and wound.   Allergic/Immunologic: Negative for environmental allergies and food allergies.       Subjective      Review of Systems:   Review of Systems    Past Medical History, Social History, Family History and Care Team were all reviewed with patient and updated as appropriate.     Medications:     Current Outpatient Medications:     celecoxib (CeleBREX) 100 MG capsule, Take 1 capsule by mouth 2 (Two) Times a Day As Needed for Mild Pain. Discontinue ketorolac, Disp: 60 capsule, Rfl: 0    hydrOXYzine pamoate (VISTARIL) 25 MG capsule, Take 1 capsule by mouth 3 (Three) Times a Day As Needed for Itching., Disp: , Rfl:     methocarbamol (ROBAXIN) 500 MG tablet, Take 1 tablet by mouth 4 (Four) Times a Day., Disp: , Rfl:     multivitamin with minerals (WOMENS MULTI VITAMIN & MINERAL PO), Take 1 tablet by mouth Daily., Disp: , Rfl:     omeprazole (priLOSEC) 20 MG capsule, Take 1 capsule by mouth Daily., Disp: 30 capsule, Rfl: 1    vitamin D (ERGOCALCIFEROL) 1.25 MG (02599 UT) capsule capsule, Take 1 capsule by mouth 1 (One) Time Per Week for 90 days., Disp: 12 capsule, Rfl: 0    Allergies:   Allergies   Allergen Reactions    Demerol [Meperidine] Swelling    Tramadol Swelling    [place] Rash       I  CT for Smoker (Age 55-75, 30pk yr): N/A  Bone Density/DEXA:     Depression: PHQ-2 Depression Screening  PHQ-9 Total Score:        Intimate partner violence: (Screen on initial visit, pregnant women, women with injuries, older adult with injury or evidence of neglect):  Violence can be a problem in many people's lives, so I now ask every patient about trauma or abuse they may have experienced in a relationship.  Stress/Safety - Do you feel safe in your relationship?  Afraid/Abused - Have you ever been in a relationship where you were threatened, hurt, or afraid?  Friend/Family - Are your friends aware you have been hurt?  Emergency Plan - Do  "you have a safe place to go and the resources you need in an emergency?    Osteoporosis:   Ost menopausal women < 65 with RF (advancing age, previous fracture, glucocorticoid therapy, parental hip fracture, low body weight, current cigarette smoking, excessive alcohol consumption, rheumatoid arthritis, secondary osteoporosis [hypogonadism/premature menopause, malabsorption, chronic liver disease, IBD]).  All women 65 or older    Objective     Physical Exam:  Vital Signs:   Vitals:    11/20/24 1436   BP: 110/72   BP Location: Right arm   Patient Position: Sitting   Cuff Size: Large Adult   Pulse: 72   SpO2: 97%   Weight: 115 kg (254 lb)   Height: 162.6 cm (64\")     Body mass index is 43.6 kg/m².        Physical Exam  Vitals and nursing note reviewed.   Constitutional:       General: She is not in acute distress.     Appearance: Normal appearance.      Comments: Alert oriented pleasant white female no acute distress she is somewhat slow to get up and lie down on the exam table due to pain in her right trapezius area was where she points mostly as being the tender area and the right upper back shoulder blade area less so the right upper chest   HENT:      Head: Normocephalic and atraumatic.      Right Ear: Tympanic membrane, ear canal and external ear normal.      Left Ear: Tympanic membrane, ear canal and external ear normal.      Nose: Nose normal.      Mouth/Throat:      Mouth: Mucous membranes are moist.      Pharynx: Oropharynx is clear.   Eyes:      Extraocular Movements: Extraocular movements intact.      Conjunctiva/sclera: Conjunctivae normal.      Pupils: Pupils are equal, round, and reactive to light.   Neck:      Thyroid: No thyroid mass or thyroid tenderness.   Cardiovascular:      Rate and Rhythm: Normal rate and regular rhythm.      Heart sounds: Normal heart sounds.      Comments: RADIAL PULSES NML  Pulmonary:      Effort: Pulmonary effort is normal.      Breath sounds: Normal breath sounds. "   Abdominal:      General: Abdomen is flat. Bowel sounds are normal.      Palpations: Abdomen is soft. There is no mass.      Tenderness: There is no abdominal tenderness. There is no guarding or rebound.   Musculoskeletal:         General: No tenderness.      Cervical back: Normal range of motion and neck supple.      Right lower leg: No edema.      Left lower leg: No edema.      Comments: She does have moderate tenderness palpation to right trapezius area less so the right upper chest and right upper back shoulder blade area the right arm biceps is normal and nontender   Lymphadenopathy:      Cervical: No cervical adenopathy.   Skin:     General: Skin is warm and dry.      Findings: No rash.   Neurological:      General: No focal deficit present.      Mental Status: She is alert and oriented to person, place, and time.      Sensory: Sensation is intact.      Motor: Motor function is intact.      Deep Tendon Reflexes: Reflexes normal.      Comments: SYMMETRIC PATELLAR REFLEXES  Cranial nerves 2 through 12 intact   Psychiatric:         Attention and Perception: Attention normal.         Mood and Affect: Mood normal.         Behavior: Behavior normal.         Thought Content: Thought content normal.         Judgment: Judgment normal.         Procedures    Assessment / Plan      Assessment/Plan:   Diagnoses and all orders for this visit:    1. Annual physical exam (Primary)    2. Breast cancer screening by mammogram  -     Mammo Screening Bilateral With CAD; Future    3. Strain of right trapezius muscle, subsequent encounter  -     celecoxib (CeleBREX) 100 MG capsule; Take 1 capsule by mouth 2 (Two) Times a Day As Needed for Mild Pain. Discontinue ketorolac  Dispense: 60 capsule; Refill: 0    4. High risk medication use  -     omeprazole (priLOSEC) 20 MG capsule; Take 1 capsule by mouth Daily.  Dispense: 30 capsule; Refill: 1    5. Upper back pain on right side       Annual physical completed as well as regular office  exam.    Set up screening mammogram as she has never had 1    Will stop Toradol she said she really did not take 1 of those as she is worried about his her history of GI bleed we will start her on Celebrex as it would be a little safer and Prilosec 20 mg once per day she may take the Celebrex once or twice a day as directed rest ice and if not better in a couple weeks or any worse she will return    Remain off of her blood pressure medicine as she is doing well and continue to monitor will follow-up in 6 months    Follow-up and test results as directed    ER notes reviewed  Assessment & Plan  1. Upper back pain.  The pain started suddenly and was severe enough to prompt an ER visit. Initial x-rays and a CT scan with contrast were clear, and blood work showed no significant abnormalities. The pain is likely musculoskeletal, possibly due to a strain or slipped rib. She was previously prescribed Toradol and methocarbamol but has been advised to discontinue ketorolac due to potential kidney damage and stomach issues. Celebrex 100 mg once or twice a day has been prescribed as a safer alternative. Prilosec (omeprazole) was recommended to protect her stomach from potential ulcers. She is advised to rest and apply ice packs for 20 minutes, two to three times daily. If symptoms persist or worsen, further evaluation may be necessary.    2. Hypertension.  She has stopped taking valsartan due to concerns about side effects and has been monitoring her blood pressure, which has remained stable. No new medication was prescribed for hypertension at this visit.    3. Duodenal erosion.  She has a history of duodenal erosion and has been avoiding NSAIDs to prevent exacerbation. Prilosec (omeprazole) was recommended to protect her stomach while taking Celebrex.    4. Anxiety.  She has hydroxyzine (Vistaril) prescribed for anxiety, which she takes as needed. She has not started escitalopram (Lexapro) 5 mg previously prescribed by  another provider.    5. Health Maintenance.  A mammogram will be scheduled as she has never had one before.                 Follow Up:   Return in about 6 months (around 5/20/2025) for Recheck, Labs prior next visit.        Patient or patient representative verbalized consent for the use of Ambient Listening during the visit with  Patrick Ibarra MD for chart documentation. 11/20/2024  15:40 EST    Patrick Ibarra MD  [facility]  Portions of note created with Dragon voice recognition technology

## 2024-11-20 NOTE — PATIENT INSTRUCTIONS
Health Maintenance, Female  Adopting a healthy lifestyle and getting preventive care can go a long way to promote health and wellness. Talk with your health care provider about what schedule of regular examinations is right for you. This is a good chance for you to check in with your provider about disease prevention and staying healthy.  In between checkups, there are plenty of things you can do on your own. Experts have done a lot of research about which lifestyle changes and preventive measures are most likely to keep you healthy. Ask your health care provider for more information.  Weight and diet  Eat a healthy diet  Be sure to include plenty of vegetables, fruits, low-fat dairy products, and lean protein.  Do not eat a lot of foods high in solid fats, added sugars, or salt.  Get regular exercise. This is one of the most important things you can do for your health.  Most adults should exercise for at least 150 minutes each week. The exercise should increase your heart rate and make you sweat (moderate-intensity exercise).  Most adults should also do strengthening exercises at least twice a week. This is in addition to the moderate-intensity exercise.     Maintain a healthy weight  Body mass index (BMI) is a measurement that can be used to identify possible weight problems. It estimates body fat based on height and weight. Your health care provider can help determine your BMI and help you achieve or maintain a healthy weight.  For females 20 years of age and older:  A BMI below 18.5 is considered underweight.  A BMI of 18.5 to 24.9 is normal.  A BMI of 25 to 29.9 is considered overweight.  A BMI of 30 and above is considered obese.     Watch levels of cholesterol and blood lipids  You should start having your blood tested for lipids and cholesterol at 20 years of age, then have this test every 5 years.  You may need to have your cholesterol levels checked more often if:  Your lipid or cholesterol levels are  high.  You are older than 50 years of age.  You are at high risk for heart disease.     Cancer screening  Lung Cancer  Lung cancer screening is recommended for adults 55-80 years old who are at high risk for lung cancer because of a history of smoking.  A yearly low-dose CT scan of the lungs is recommended for people who:  Currently smoke.  Have quit within the past 15 years.  Have at least a 30-pack-year history of smoking. A pack year is smoking an average of one pack of cigarettes a day for 1 year.  Yearly screening should continue until it has been 15 years since you quit.  Yearly screening should stop if you develop a health problem that would prevent you from having lung cancer treatment.     Breast Cancer  Practice breast self-awareness. This means understanding how your breasts normally appear and feel.  It also means doing regular breast self-exams. Let your health care provider know about any changes, no matter how small.  If you are in your 20s or 30s, you should have a clinical breast exam (CBE) by a health care provider every 1-3 years as part of a regular health exam.  If you are 40 or older, have a CBE every year. Also consider having a breast X-ray (mammogram) every year.  If you have a family history of breast cancer, talk to your health care provider about genetic screening.  If you are at high risk for breast cancer, talk to your health care provider about having an MRI and a mammogram every year.  Breast cancer gene (BRCA) assessment is recommended for women who have family members with BRCA-related cancers. BRCA-related cancers include:  Breast.  Ovarian.  Tubal.  Peritoneal cancers.  Results of the assessment will determine the need for genetic counseling and BRCA1 and BRCA2 testing.     Cervical Cancer  Your health care provider may recommend that you be screened regularly for cancer of the pelvic organs (ovaries, uterus, and vagina). This screening involves a pelvic examination, including  checking for microscopic changes to the surface of your cervix (Pap test). You may be encouraged to have this screening done every 3 years, beginning at age 21.  For women ages 30-65, health care providers may recommend pelvic exams and Pap testing every 3 years, or they may recommend the Pap and pelvic exam, combined with testing for human papilloma virus (HPV), every 5 years. Some types of HPV increase your risk of cervical cancer. Testing for HPV may also be done on women of any age with unclear Pap test results.  Other health care providers may not recommend any screening for nonpregnant women who are considered low risk for pelvic cancer and who do not have symptoms. Ask your health care provider if a screening pelvic exam is right for you.  If you have had past treatment for cervical cancer or a condition that could lead to cancer, you need Pap tests and screening for cancer for at least 20 years after your treatment. If Pap tests have been discontinued, your risk factors (such as having a new sexual partner) need to be reassessed to determine if screening should resume. Some women have medical problems that increase the chance of getting cervical cancer. In these cases, your health care provider may recommend more frequent screening and Pap tests.     Colorectal Cancer  This type of cancer can be detected and often prevented.  Routine colorectal cancer screening usually begins at 50 years of age and continues through 75 years of age.  Your health care provider may recommend screening at an earlier age if you have risk factors for colon cancer.  Your health care provider may also recommend using home test kits to check for hidden blood in the stool.  A small camera at the end of a tube can be used to examine your colon directly (sigmoidoscopy or colonoscopy). This is done to check for the earliest forms of colorectal cancer.  Routine screening usually begins at age 50.  Direct examination of the colon should  be repeated every 5-10 years through 75 years of age. However, you may need to be screened more often if early forms of precancerous polyps or small growths are found.     Skin Cancer  Check your skin from head to toe regularly.  Tell your health care provider about any new moles or changes in moles, especially if there is a change in a mole's shape or color.  Also tell your health care provider if you have a mole that is larger than the size of a pencil eraser.  Always use sunscreen. Apply sunscreen liberally and repeatedly throughout the day.  Protect yourself by wearing long sleeves, pants, a wide-brimmed hat, and sunglasses whenever you are outside.     Heart disease, diabetes, and high blood pressure  High blood pressure causes heart disease and increases the risk of stroke. High blood pressure is more likely to develop in:  People who have blood pressure in the high end of the normal range (130-139/85-89 mm Hg).  People who are overweight or obese.  People who are .  If you are 18-39 years of age, have your blood pressure checked every 3-5 years. If you are 40 years of age or older, have your blood pressure checked every year. You should have your blood pressure measured twice--once when you are at a hospital or clinic, and once when you are not at a hospital or clinic. Record the average of the two measurements. To check your blood pressure when you are not at a hospital or clinic, you can use:  An automated blood pressure machine at a pharmacy.  A home blood pressure monitor.  If you are between 55 years and 79 years old, ask your health care provider if you should take aspirin to prevent strokes.  Have regular diabetes screenings. This involves taking a blood sample to check your fasting blood sugar level.  If you are at a normal weight and have a low risk for diabetes, have this test once every three years after 45 years of age.  If you are overweight and have a high risk for diabetes,  consider being tested at a younger age or more often.  Preventing infection  Hepatitis B  If you have a higher risk for hepatitis B, you should be screened for this virus. You are considered at high risk for hepatitis B if:  You were born in a country where hepatitis B is common. Ask your health care provider which countries are considered high risk.  Your parents were born in a high-risk country, and you have not been immunized against hepatitis B (hepatitis B vaccine).  You have HIV or AIDS.  You use needles to inject street drugs.  You live with someone who has hepatitis B.  You have had sex with someone who has hepatitis B.  You get hemodialysis treatment.  You take certain medicines for conditions, including cancer, organ transplantation, and autoimmune conditions.     Hepatitis C  Blood testing is recommended for:  Everyone born from 1945 through 1965.  Anyone with known risk factors for hepatitis C.     Sexually transmitted infections (STIs)  You should be screened for sexually transmitted infections (STIs) including gonorrhea and chlamydia if:  You are sexually active and are younger than 24 years of age.  You are older than 24 years of age and your health care provider tells you that you are at risk for this type of infection.  Your sexual activity has changed since you were last screened and you are at an increased risk for chlamydia or gonorrhea. Ask your health care provider if you are at risk.  If you do not have HIV, but are at risk, it may be recommended that you take a prescription medicine daily to prevent HIV infection. This is called pre-exposure prophylaxis (PrEP). You are considered at risk if:  You are sexually active and do not regularly use condoms or know the HIV status of your partner(s).  You take drugs by injection.  You are sexually active with a partner who has HIV.     Talk with your health care provider about whether you are at high risk of being infected with HIV. If you choose to  begin PrEP, you should first be tested for HIV. You should then be tested every 3 months for as long as you are taking PrEP.  Pregnancy  If you are premenopausal and you may become pregnant, ask your health care provider about preconception counseling.  If you may become pregnant, take 400 to 800 micrograms (mcg) of folic acid every day.  If you want to prevent pregnancy, talk to your health care provider about birth control (contraception).  Osteoporosis and menopause  Osteoporosis is a disease in which the bones lose minerals and strength with aging. This can result in serious bone fractures. Your risk for osteoporosis can be identified using a bone density scan.  If you are 65 years of age or older, or if you are at risk for osteoporosis and fractures, ask your health care provider if you should be screened.  Ask your health care provider whether you should take a calcium or vitamin D supplement to lower your risk for osteoporosis.  Menopause may have certain physical symptoms and risks.  Hormone replacement therapy may reduce some of these symptoms and risks.  Talk to your health care provider about whether hormone replacement therapy is right for you.  Follow these instructions at home:  Schedule regular health, dental, and eye exams.  Stay current with your immunizations.  Do not use any tobacco products including cigarettes, chewing tobacco, or electronic cigarettes.  If you are pregnant, do not drink alcohol.  If you are breastfeeding, limit how much and how often you drink alcohol.  Limit alcohol intake to no more than 1 drink per day for nonpregnant women. One drink equals 12 ounces of beer, 5 ounces of wine, or 1½ ounces of hard liquor.  Do not use street drugs.  Do not share needles.  Ask your health care provider for help if you need support or information about quitting drugs.  Tell your health care provider if you often feel depressed.  Tell your health care provider if you have ever been abused or do  not feel safe at home.  This information is not intended to replace advice given to you by your health care provider. Make sure you discuss any questions you have with your health care provider.  Document Released: 07/02/2012 Document Revised: 05/25/2017 Document Reviewed: 09/20/2016  [employer] Interactive Patient Education © 2018 [employer]

## 2024-11-22 ENCOUNTER — OFFICE VISIT (OUTPATIENT)
Age: 44
End: 2024-11-22
Payer: COMMERCIAL

## 2024-11-22 VITALS
WEIGHT: 253.4 LBS | HEIGHT: 64 IN | DIASTOLIC BLOOD PRESSURE: 80 MMHG | BODY MASS INDEX: 43.26 KG/M2 | HEART RATE: 76 BPM | SYSTOLIC BLOOD PRESSURE: 132 MMHG

## 2024-11-22 DIAGNOSIS — E66.01 CLASS 3 SEVERE OBESITY DUE TO EXCESS CALORIES WITH SERIOUS COMORBIDITY AND BODY MASS INDEX (BMI) OF 40.0 TO 44.9 IN ADULT: Primary | ICD-10-CM

## 2024-11-22 DIAGNOSIS — E66.813 CLASS 3 SEVERE OBESITY DUE TO EXCESS CALORIES WITH SERIOUS COMORBIDITY AND BODY MASS INDEX (BMI) OF 40.0 TO 44.9 IN ADULT: Primary | ICD-10-CM

## 2024-11-22 PROCEDURE — 1159F MED LIST DOCD IN RCRD: CPT | Performed by: NURSE PRACTITIONER

## 2024-11-22 PROCEDURE — 3075F SYST BP GE 130 - 139MM HG: CPT | Performed by: NURSE PRACTITIONER

## 2024-11-22 PROCEDURE — 99213 OFFICE O/P EST LOW 20 MIN: CPT | Performed by: NURSE PRACTITIONER

## 2024-11-22 PROCEDURE — 1160F RVW MEDS BY RX/DR IN RCRD: CPT | Performed by: NURSE PRACTITIONER

## 2024-11-22 PROCEDURE — 3079F DIAST BP 80-89 MM HG: CPT | Performed by: NURSE PRACTITIONER

## 2024-11-22 NOTE — ASSESSMENT & PLAN NOTE
Patient's (Body mass index is 43.5 kg/m².) indicates that they are morbidly/severely obese (BMI > 40 or > 35 with obesity - related health condition) with health conditions that include hypertension, GERD, and osteoarthritis . Weight is improving with lifestyle modifications. BMI  is above average; BMI management plan is completed. We discussed low calorie, low carb based diet program, portion control, increasing exercise, and an bro-based approach such as [email or web address] Pal or Lose It.       The current plan for this month includes:   - Increase water to recommended daily amount  - Weight loss goal 4-6lbs this month  - Adjust macronutrients as discussed. Bring food journal to next visit for review; increase protein intake  Congratulated patient on lowering net carb intake. Keep up the great effort!

## 2024-11-22 NOTE — PROGRESS NOTES
[facility]  [address] [place] [address]    Office Note Follow Up      Date: 2024  Patient Name: Vee Garvin  MRN: [id number]  : 1980    Subjective     Chief Complaint  Obesity Management follow-up    Vee Garvin presents to [facility] for obesity management.     Patient is unsatisfied with weight loss progress. Appetite is well controlled. Currently taking no medications from [facility], nutrition focus only. She is taking magnesium daily and this has resolved constipation. She has not been drinking enough water. The patient is taking multivitamin and is not taking fish oil.  The patient is using a food journal.    Average daily calories: 842  Average daily protein: 62  Average daily net carbs: 53    The patient is exercising with a FITT score of:    Frequency Intensity Time Strength Training   []   0, none []   0 []   0 []   0   []   1 (1-2x/week) [x]   1 (light) [x]   1 (<10 min) []   1 (1x/week)   [x]   2 (3-5x/week) [x]   2 (moderate) []   2 (10-20 min) []   2 (2x/week)   []   3 (daily) []   3 (moderately hard)  []   4 (very hard) []   3 (20-30 min)  []   4 (>30 min) [x]   3 (3-4x/week)     Review of Systems   Constitutional:  Negative for appetite change and fatigue.   Eyes:  Negative for visual disturbance.   Cardiovascular:  Negative for chest pain and palpitations.   Gastrointestinal:  Negative for constipation and indigestion.   Neurological:  Negative for light-headedness.   All other systems reviewed and are negative.      Objective   Start weight: 267.6 pounds.    Total Loss lb/%Loss of beginning body weight (BBW): -14.2 lb/-5.31 %  Change in weight since last visit: -4.8    Recent Weight History:   Wt Readings from Last 6 Encounters:   24 115 kg (253 lb 6.4 oz)   24 115 kg (254 lb)   10/24/24 117 kg (257 lb)   10/22/24 117 kg (258 lb 3.2 oz)   10/02/24 121 kg (267 lb 9.6 oz)   24 123 kg (271  "lb)         Body mass index is 43.5 kg/m².   Body composition analysis completed and showed:   Body Fat %: 51.3    Measurements (in inches)  Waist Circumference: 43.5    Vital Signs:   /80 (BP Location: Right arm, Patient Position: Sitting)   Pulse 76   Ht 162.6 cm (64\")   Wt 115 kg (253 lb 6.4 oz)   BMI 43.50 kg/m²       Physical Exam  Constitutional:       Appearance: Normal appearance.   HENT:      Head: Normocephalic and atraumatic.   Pulmonary:      Effort: Pulmonary effort is normal.   Neurological:      Mental Status: She is alert and oriented to person, place, and time.   Psychiatric:         Mood and Affect: Mood normal.         Thought Content: Thought content normal.                   Assessment / Plan        Diagnoses and all orders for this visit:    1. Class 3 severe obesity due to excess calories with serious comorbidity and body mass index (BMI) of 40.0 to 44.9 in adult (Primary)  Assessment & Plan:  Patient's (Body mass index is 43.5 kg/m².) indicates that they are morbidly/severely obese (BMI > 40 or > 35 with obesity - related health condition) with health conditions that include hypertension, GERD, and osteoarthritis . Weight is improving with lifestyle modifications. BMI  is above average; BMI management plan is completed. We discussed low calorie, low carb based diet program, portion control, increasing exercise, and an bro-based approach such as Workday Pal or Lose It.       The current plan for this month includes:   - Increase water to recommended daily amount  - Weight loss goal 4-6lbs this month  - Adjust macronutrients as discussed. Bring food journal to next visit for review; increase protein intake  Congratulated patient on lowering net carb intake. Keep up the great effort!            We discussed the risks, benefits, and limitations of treatments. Continue medications and OTC supplements as discussed. Patient verbalizes understanding of and agreement with management plan. "     Follow Up   Return in about 4 weeks (around 12/20/2024).  Patient was given instructions and counseling regarding her condition or for health maintenance advice. Please see specific information pulled into the AVS if appropriate.     I spent 21 minutes on this date of service. This time includes time spent by me in the following activities:preparing for the visit, counseling and educating the patient/family/caregiver, ordering medications, tests, or procedures and documenting information in the medical record.    James Estrella, APRN  11/22/2024

## 2024-12-05 ENCOUNTER — PATIENT MESSAGE (OUTPATIENT)
Age: 44
End: 2024-12-05
Payer: COMMERCIAL

## 2024-12-05 DIAGNOSIS — R21 RASH: Primary | ICD-10-CM

## 2025-01-02 RX ORDER — ERGOCALCIFEROL 1.25 MG/1
50000 CAPSULE, LIQUID FILLED ORAL WEEKLY
Qty: 12 CAPSULE | Refills: 0 | OUTPATIENT
Start: 2025-01-02

## 2025-01-15 ENCOUNTER — TRANSCRIBE ORDERS (OUTPATIENT)
Dept: CT IMAGING | Facility: CLINIC | Age: 45
End: 2025-01-15
Payer: COMMERCIAL

## 2025-01-15 DIAGNOSIS — Z12.31 ENCOUNTER FOR SCREENING MAMMOGRAM FOR MALIGNANT NEOPLASM OF BREAST: Primary | ICD-10-CM

## 2025-01-23 PROBLEM — R76.8 POSITIVE ANA (ANTINUCLEAR ANTIBODY): Status: ACTIVE | Noted: 2025-01-23

## 2025-01-23 PROBLEM — M25.50 ARTHRALGIA: Status: ACTIVE | Noted: 2025-01-23

## 2025-01-28 ENCOUNTER — OFFICE VISIT (OUTPATIENT)
Dept: FAMILY MEDICINE CLINIC | Facility: CLINIC | Age: 45
End: 2025-01-28
Payer: COMMERCIAL

## 2025-01-28 VITALS
RESPIRATION RATE: 16 BRPM | BODY MASS INDEX: 42.78 KG/M2 | WEIGHT: 250.6 LBS | SYSTOLIC BLOOD PRESSURE: 114 MMHG | OXYGEN SATURATION: 96 % | TEMPERATURE: 98 F | DIASTOLIC BLOOD PRESSURE: 80 MMHG | HEIGHT: 64 IN | HEART RATE: 70 BPM

## 2025-01-28 DIAGNOSIS — S06.9XAD MILD TRAUMATIC BRAIN INJURY, WITH UNKNOWN LOSS OF CONSCIOUSNESS STATUS, SUBSEQUENT ENCOUNTER: Primary | ICD-10-CM

## 2025-01-28 PROBLEM — R51.9 HEADACHE: Status: ACTIVE | Noted: 2025-01-16

## 2025-01-28 PROBLEM — H52.223 REGULAR ASTIGMATISM OF BOTH EYES: Status: ACTIVE | Noted: 2022-08-25

## 2025-01-28 PROBLEM — S06.9XAA MILD TRAUMATIC BRAIN INJURY: Status: ACTIVE | Noted: 2025-01-28

## 2025-01-28 PROBLEM — S09.90XA INJURY OF HEAD: Status: ACTIVE | Noted: 2025-01-16

## 2025-01-28 PROCEDURE — 3074F SYST BP LT 130 MM HG: CPT

## 2025-01-28 PROCEDURE — 3079F DIAST BP 80-89 MM HG: CPT

## 2025-01-28 PROCEDURE — 1160F RVW MEDS BY RX/DR IN RCRD: CPT

## 2025-01-28 PROCEDURE — 99213 OFFICE O/P EST LOW 20 MIN: CPT

## 2025-01-28 PROCEDURE — 1159F MED LIST DOCD IN RCRD: CPT

## 2025-01-28 PROCEDURE — 1126F AMNT PAIN NOTED NONE PRSNT: CPT

## 2025-01-28 RX ORDER — ONDANSETRON 4 MG/1
4 TABLET, ORALLY DISINTEGRATING ORAL EVERY 6 HOURS
COMMUNITY
Start: 2025-01-16

## 2025-01-28 NOTE — PROGRESS NOTES
Acute Office Visit      Date: 2025  Patient Name: Vee Garvin  : 1980   MRN: [id number]     Chief Complaint   Patient presents with    Hospital Follow Up Visit     [facility]       History of Present Illness: Vee Garvin is a 45 y.o. female who is here today for hospital f/u after having TBI. She was seen and evaluated in the ER at [facility].       ER follow post head injury and fall  Symptoms are: recurrent.   Onset was in the past 7 days.   Symptoms occur: intermittently.  Symptoms include: joint pain, diaphoresis, fatigue, headaches, joint swelling, nausea, neck pain and vertigo.   Pertinent negative symptoms include no abdominal pain, no anorexia, no change in stool, no chest pain, no chills, no congestion, no cough, no fever, no myalgias, no numbness, no rash, no sore throat, no swollen glands, no dysuria, no visual change, no vomiting and no weakness.   Treatment and/or Medications comments include: None   Additional information: The symptoms im feeling are after an ice block hit me in the back of the head. I fell on my right side, but dont remember falling. Nausea is only when i move. Dizziness comes and goes.       Saw ortho - Janneth Strickland from [facility] - MRI of hip in progress, fell on R hip after being hit by ice in the head.  H/A and neck pain  Dizziness when rising from seated position and somewhat unsteady on her feet      Subjective     Current Outpatient Medications   Medication Instructions    hydrOXYzine pamoate (VISTARIL) 25 mg, 3 Times Daily PRN    multivitamin with minerals (WOMENS MULTI VITAMIN & MINERAL PO) 1 tablet, Daily    omeprazole (PRILOSEC) 20 mg, Oral, Daily    ondansetron ODT (ZOFRAN-ODT) 4 mg, Every 6 Hours       Allergies   Allergen Reactions    Demerol [Meperidine] Swelling    Tramadol Swelling    Citrus Rash       Objective     Vitals:    25 0838   BP: 114/80   BP Location: Left arm   Patient Position: Sitting   Cuff Size: Large Adult  "  Pulse: 70   Resp: 16   Temp: 98 °F (36.7 °C)   TempSrc: Oral   SpO2: 96%   Weight: 114 kg (250 lb 9.6 oz)   Height: 162.6 cm (64\")   PainSc: 0-No pain       Body mass index is 43.02 kg/m².     Physical Exam  Vitals and nursing note reviewed.   Constitutional:       General: She is not in acute distress.  HENT:      Head: Normocephalic.      Right Ear: Tympanic membrane normal.      Left Ear: Tympanic membrane normal.      Nose: Nose normal.      Mouth/Throat:      Mouth: Mucous membranes are moist.   Eyes:      General: Vision grossly intact. Gaze aligned appropriately.      Extraocular Movements:      Right eye: Right eye nystagmus: trace horizontal nystagmus bilaterallly.      Pupils: Pupils are equal, round, and reactive to light.   Neck:      Thyroid: No thyromegaly or thyroid tenderness.   Cardiovascular:      Rate and Rhythm: Normal rate and regular rhythm.      Pulses: Normal pulses.      Heart sounds: Normal heart sounds.   Pulmonary:      Effort: Pulmonary effort is normal.      Breath sounds: Normal breath sounds.   Abdominal:      General: Bowel sounds are normal.      Palpations: Abdomen is soft.   Musculoskeletal:         General: No deformity. Normal range of motion.      Cervical back: Normal range of motion.      Right lower leg: No edema.      Left lower leg: No edema.   Lymphadenopathy:      Cervical: No cervical adenopathy.   Skin:     General: Skin is warm and dry.      Capillary Refill: Capillary refill takes less than 2 seconds.   Neurological:      Mental Status: She is alert and oriented to person, place, and time.      Motor: No weakness, tremor, atrophy, abnormal muscle tone or pronator drift.      Coordination: Romberg sign negative (pt did become dizzy and nauseated with romberg test). Coordination normal. Heel to Shin Test normal.   Psychiatric:         Mood and Affect: Mood normal.         Behavior: Behavior normal.       The 10-year ASCVD risk score (Michel DK, et al., 2019) is: " 0.9%    Values used to calculate the score:      Age: 45 years      Sex: Female      Is Non- : No      Diabetic: No      Tobacco smoker: No      Systolic Blood Pressure: 114 mmHg      Is BP treated: No      HDL Cholesterol: 38 mg/dL      Total Cholesterol: 175 mg/dL             Assessment / Plan      Diagnoses and all orders for this visit:    1. Mild traumatic brain injury, with unknown loss of consciousness status, subsequent encounter (Primary)  Assessment & Plan:  [place] [facility] [place] with a quite large chunk of ice that fell from her roof  12 days later pt still having dizziness, nausea, h/a, photosensitivity, and fatigue  CT scan in ER negative for bleed or skull fx  Patient education materials attached to AVS related to concussion recovery and supporting someone with concussion.   Materials were reviewed with patient during their office visit today and all questions addressed.    Plan:  Head CT without [id number] r/t persistent symptoms worsening instead of improving    Orders:  -     CT Head Without Contrast; Future    Follow Up:   Return in 2 weeks (on 2/11/2025), or if symptoms worsen or fail to improve.    Patient Education:   Reviewed medications, potential side effects and signs and symptoms to report.   Discussed risk vs benefits of treatment plan with patient including medications, labs, and imaging.    Patient education materials attached to AVS and reviewed with patient during office visit today.   Addressed questions and concerns during visit. Patient verbalized understanding and agrees with tx plan.   Instructed to call the office with any questions and report to ER with any life-threatening symptoms.    QUE Lam (Libby) [facility]  [address] [place] [id number] [phone]    NOTE TO PATIENT:   The 21st Century Cures Act makes medical notes like these available to  patients in the interest of transparency. However, be advised this is a medical document. It is intended as peer to peer communication. It is written in medical language and may contain abbreviations or verbiage that are unfamiliar. It may appear blunt or direct. Medical documents are intended to carry relevant information, facts as evident, and the clinical opinion of the practitioner.     EMR Dragon/Transcription disclaimer:   Much of this encounter note is an electronic transcription of spoken language to printed text. Electronic transcription of spoken language may permit erroneous, or at times, nonsensical words or phrases to be inadvertently transcribed. Although I have reviewed the note for such errors, some may still exist.

## 2025-01-29 NOTE — ASSESSMENT & PLAN NOTE
Seen in the ER at [facility] after blow to the head with a quite large chunk of ice that fell from her roof  12 days later pt still having dizziness, nausea, h/a, photosensitivity, and fatigue  CT scan in ER negative for bleed or skull fx  Patient education materials attached to AVS related to concussion recovery and supporting someone with concussion.   Materials were reviewed with patient during their office visit today and all questions addressed.    Plan:  Head CT without [id number]-[id number]-ut r/t persistent symptoms worsening instead of improving

## 2025-02-11 ENCOUNTER — TELEMEDICINE (OUTPATIENT)
Dept: FAMILY MEDICINE CLINIC | Facility: CLINIC | Age: 45
End: 2025-02-11
Payer: COMMERCIAL

## 2025-02-11 VITALS — WEIGHT: 248 LBS | BODY MASS INDEX: 42.34 KG/M2 | HEIGHT: 64 IN

## 2025-02-11 DIAGNOSIS — S06.9XAD MILD TRAUMATIC BRAIN INJURY, WITH UNKNOWN LOSS OF CONSCIOUSNESS STATUS, SUBSEQUENT ENCOUNTER: Primary | ICD-10-CM

## 2025-02-11 PROCEDURE — 99213 OFFICE O/P EST LOW 20 MIN: CPT

## 2025-02-11 PROCEDURE — 1159F MED LIST DOCD IN RCRD: CPT

## 2025-02-11 PROCEDURE — 1125F AMNT PAIN NOTED PAIN PRSNT: CPT

## 2025-02-11 PROCEDURE — 1160F RVW MEDS BY RX/DR IN RCRD: CPT

## 2025-02-11 NOTE — PROGRESS NOTES
Telehealth E-Visit      Date: 2025   Patient Name: Vee Garvin  : 1980   MRN: [id number]     Chief Complaint:    Chief Complaint   Patient presents with    Follow-up     Review CT scan       I have reviewed the E-Visit questionnaire and the patient's answers, my assessment and plan are listed below.     This provider is located at the [facility] (through [facility]), [address] [place] [address] using a secure [employer] Video Visit through [employer]. Patient is being seen remotely via telehealth at their home address in [place], and stated they are in a secure environment for this session. The patient's condition being diagnosed/treated is appropriate for telemedicine. The provider identified herself as well as her credentials. The patient, and/or patients guardian, consent to be seen remotely, and when consent is given they understand that the consent allows for patient identifiable information to be sent to a third party as needed. They may refuse to be seen remotely at any time. The electronic data is encrypted and password protected, and the patient and/or guardian has been advised of the potential risks to privacy not withstanding such measures.    You have chosen to receive care through a telehealth visit. Do you consent to use a video/audio connection for your medical care today? Yes    History of Present Illness: Vee Garvin is a 45 y.o. female who is being evaluated telephonically today for follow-up related to recent imaging completed after previous appointment r/t symptoms of concussion.  Following that appointment patient was sent for noncontrast CT to rule out slow brain bleed or mass secondary to ongoing symptoms.  Patient reports symptoms have gradually improved since her last visit and she is ambulating more steady and neurological symptoms are beginning to resolve.  She still reports some photophobia and  "phonophobia.      Subjective        I have reviewed and the following portions of the patient's history were updated as appropriate: past family history, past medical history, past social history, past surgical history and problem list.      Current Outpatient Medications   Medication Instructions    hydrOXYzine pamoate (VISTARIL) 25 mg, 3 Times Daily PRN    multivitamin with minerals (WOMENS MULTI VITAMIN & MINERAL PO) 1 tablet, Daily    omeprazole (PRILOSEC) 20 mg, Oral, Daily    ondansetron ODT (ZOFRAN-ODT) 4 mg, Every 6 Hours        Allergies   Allergen Reactions    Demerol [Meperidine] Swelling    Tramadol Swelling    Citrus Rash       Objective       Vitals:    02/11/25 1322   BP: Comment: unable to perform telehealth   Weight: 112 kg (248 lb)   Height: 162.6 cm (64\")   PainSc:   8   PainLoc: Hip     Body mass index is 42.57 kg/m².    Physical Exam  Constitutional:       General: She is not in acute distress.     Appearance: Normal appearance. She is not toxic-appearing.   Pulmonary:      Effort: Pulmonary effort is normal. No respiratory distress.   Neurological:      Mental Status: She is alert and oriented to person, place, and time.   Psychiatric:         Mood and Affect: Mood normal.         Thought Content: Thought content normal.           Assessment / Plan      Assessment & Plan  Mild traumatic brain injury, with unknown loss of consciousness status, subsequent encounter  Patient scheduled follow-up telehealth to discuss recent results of CT scan.  Follow-up CT was also negative for bleed or any abnormality  Symptoms of headache, photophobia, phonophobia, and dizziness are slowly resolving  Patient is continuing with brain rest recommendations including limiting screen time.  She has no additional concerns at this time and feels like she is recovering well  Acknowledges she has routine wellness visit with Dr. Ibarra in May and will follow-up earlier than that if she has additional questions or " concerns.            Follow Up:   Return if symptoms worsen or fail to improve.    Any medications prescribed have been sent electronically to   [id number] - [place], [place] - [address] AT [facility] [address] - [phone] PH - [phone] FX  [address]  [facility] [address]  Phone: [phone] Fax: [phone]    [id number] - [place] - [address] AT [facility] [address] - [phone] PH - [phone] FX  [address]  [address]  [place] [address]  Phone: [phone] Fax: [phone]      QUE Lam (Libby)  [facility] [address] [address]  Phone: [phone]  Fax: [phone]    NOTES TO PATIENT:   The 21st Century Cures Act makes medical notes like these available to patients in the interest of transparency. However, be advised this is a medical document. It is intended as peer to peer communication. It is written in medical language and may contain abbreviations or verbiage that are unfamiliar. It may appear blunt or direct. Medical documents are intended to carry relevant information, facts as evident, and the clinical opinion of the practitioner.    EMR Dragon/Transcription disclaimer:  Much of this encounter note is an electronic transcription of spoken language to printed text. Electronic transcription of spoken language may permit erroneous, or at times, nonsensical words or phrases to be inadvertently transcribed. Although I have reviewed the note for such errors, some may still exist.

## 2025-02-11 NOTE — PATIENT INSTRUCTIONS
Health Maintenance, Female  Adopting a healthy lifestyle and getting preventive care can go a long way to promote health and wellness. Talk with your health care provider about what schedule of regular examinations is right for you. This is a good chance for you to check in with your provider about disease prevention and staying healthy.  In between checkups, there are plenty of things you can do on your own. Experts have done a lot of research about which lifestyle changes and preventive measures are most likely to keep you healthy. Ask your health care provider for more information.  Weight and diet  Eat a healthy diet  Be sure to include plenty of vegetables, fruits, low-fat dairy products, and lean protein.  Do not eat a lot of foods high in solid fats, added sugars, or salt.  Get regular exercise. This is one of the most important things you can do for your health.  Most adults should exercise for at least 150 minutes each week. The exercise should increase your heart rate and make you sweat (moderate-intensity exercise).  Most adults should also do strengthening exercises at least twice a week. This is in addition to the moderate-intensity exercise.     Maintain a healthy weight  Body mass index (BMI) is a measurement that can be used to identify possible weight problems. It estimates body fat based on height and weight. Your health care provider can help determine your BMI and help you achieve or maintain a healthy weight.  For females 20 years of age and older:  A BMI below 18.5 is considered underweight.  A BMI of 18.5 to 24.9 is normal.  A BMI of 25 to 29.9 is considered overweight.  A BMI of 30 and above is considered obese.     Watch levels of cholesterol and blood lipids  You should start having your blood tested for lipids and cholesterol at 20 years of age, then have this test every 5 years.  You may need to have your cholesterol levels checked more often if:  Your lipid or cholesterol levels are  high.  You are older than 50 years of age.  You are at high risk for heart disease.     Cancer screening  Lung Cancer  Lung cancer screening is recommended for adults 55-80 years old who are at high risk for lung cancer because of a history of smoking.  A yearly low-dose CT scan of the lungs is recommended for people who:  Currently smoke.  Have quit within the past 15 years.  Have at least a 30-pack-year history of smoking. A pack year is smoking an average of one pack of cigarettes a day for 1 year.  Yearly screening should continue until it has been 15 years since you quit.  Yearly screening should stop if you develop a health problem that would prevent you from having lung cancer treatment.     Breast Cancer  Practice breast self-awareness. This means understanding how your breasts normally appear and feel.  It also means doing regular breast self-exams. Let your health care provider know about any changes, no matter how small.  If you are in your 20s or 30s, you should have a clinical breast exam (CBE) by a health care provider every 1-3 years as part of a regular health exam.  If you are 40 or older, have a CBE every year. Also consider having a breast X-ray (mammogram) every year.  If you have a family history of breast cancer, talk to your health care provider about genetic screening.  If you are at high risk for breast cancer, talk to your health care provider about having an MRI and a mammogram every year.  Breast cancer gene (BRCA) assessment is recommended for women who have family members with BRCA-related cancers. BRCA-related cancers include:  Breast.  Ovarian.  Tubal.  Peritoneal cancers.  Results of the assessment will determine the need for genetic counseling and BRCA1 and BRCA2 testing.     Cervical Cancer  Your health care provider may recommend that you be screened regularly for cancer of the pelvic organs (ovaries, uterus, and vagina). This screening involves a pelvic examination, including  checking for microscopic changes to the surface of your cervix (Pap test). You may be encouraged to have this screening done every 3 years, beginning at age 21.  For women ages 30-65, health care providers may recommend pelvic exams and Pap testing every 3 years, or they may recommend the Pap and pelvic exam, combined with testing for human papilloma virus (HPV), every 5 years. Some types of HPV increase your risk of cervical cancer. Testing for HPV may also be done on women of any age with unclear Pap test results.  Other health care providers may not recommend any screening for nonpregnant women who are considered low risk for pelvic cancer and who do not have symptoms. Ask your health care provider if a screening pelvic exam is right for you.  If you have had past treatment for cervical cancer or a condition that could lead to cancer, you need Pap tests and screening for cancer for at least 20 years after your treatment. If Pap tests have been discontinued, your risk factors (such as having a new sexual partner) need to be reassessed to determine if screening should resume. Some women have medical problems that increase the chance of getting cervical cancer. In these cases, your health care provider may recommend more frequent screening and Pap tests.     Colorectal Cancer  This type of cancer can be detected and often prevented.  Routine colorectal cancer screening usually begins at 50 years of age and continues through 75 years of age.  Your health care provider may recommend screening at an earlier age if you have risk factors for colon cancer.  Your health care provider may also recommend using home test kits to check for hidden blood in the stool.  A small camera at the end of a tube can be used to examine your colon directly (sigmoidoscopy or colonoscopy). This is done to check for the earliest forms of colorectal cancer.  Routine screening usually begins at age 50.  Direct examination of the colon should  be repeated every 5-10 years through 75 years of age. However, you may need to be screened more often if early forms of precancerous polyps or small growths are found.     Skin Cancer  Check your skin from head to toe regularly.  Tell your health care provider about any new moles or changes in moles, especially if there is a change in a mole's shape or color.  Also tell your health care provider if you have a mole that is larger than the size of a pencil eraser.  Always use sunscreen. Apply sunscreen liberally and repeatedly throughout the day.  Protect yourself by wearing long sleeves, pants, a wide-brimmed hat, and sunglasses whenever you are outside.     Heart disease, diabetes, and high blood pressure  High blood pressure causes heart disease and increases the risk of stroke. High blood pressure is more likely to develop in:  People who have blood pressure in the high end of the normal range (130-139/85-89 mm Hg).  People who are overweight or obese.  People who are .  If you are 18-39 years of age, have your blood pressure checked every 3-5 years. If you are 40 years of age or older, have your blood pressure checked every year. You should have your blood pressure measured twice--once when you are at a hospital or clinic, and once when you are not at a hospital or clinic. Record the average of the two measurements. To check your blood pressure when you are not at a hospital or clinic, you can use:  An automated blood pressure machine at a pharmacy.  A home blood pressure monitor.  If you are between 55 years and 79 years old, ask your health care provider if you should take aspirin to prevent strokes.  Have regular diabetes screenings. This involves taking a blood sample to check your fasting blood sugar level.  If you are at a normal weight and have a low risk for diabetes, have this test once every three years after 45 years of age.  If you are overweight and have a high risk for diabetes,  consider being tested at a younger age or more often.  Preventing infection  Hepatitis B  If you have a higher risk for hepatitis B, you should be screened for this virus. You are considered at high risk for hepatitis B if:  You were born in a country where hepatitis B is common. Ask your health care provider which countries are considered high risk.  Your parents were born in a high-risk country, and you have not been immunized against hepatitis B (hepatitis B vaccine).  You have HIV or AIDS.  You use needles to inject street drugs.  You live with someone who has hepatitis B.  You have had sex with someone who has hepatitis B.  You get hemodialysis treatment.  You take certain medicines for conditions, including cancer, organ transplantation, and autoimmune conditions.     Hepatitis C  Blood testing is recommended for:  Everyone born from 1945 through 1965.  Anyone with known risk factors for hepatitis C.     Sexually transmitted infections (STIs)  You should be screened for sexually transmitted infections (STIs) including gonorrhea and chlamydia if:  You are sexually active and are younger than 24 years of age.  You are older than 24 years of age and your health care provider tells you that you are at risk for this type of infection.  Your sexual activity has changed since you were last screened and you are at an increased risk for chlamydia or gonorrhea. Ask your health care provider if you are at risk.  If you do not have HIV, but are at risk, it may be recommended that you take a prescription medicine daily to prevent HIV infection. This is called pre-exposure prophylaxis (PrEP). You are considered at risk if:  You are sexually active and do not regularly use condoms or know the HIV status of your partner(s).  You take drugs by injection.  You are sexually active with a partner who has HIV.     Talk with your health care provider about whether you are at high risk of being infected with HIV. If you choose to  begin PrEP, you should first be tested for HIV. You should then be tested every 3 months for as long as you are taking PrEP.  Pregnancy  If you are premenopausal and you may become pregnant, ask your health care provider about preconception counseling.  If you may become pregnant, take 400 to 800 micrograms (mcg) of folic acid every day.  If you want to prevent pregnancy, talk to your health care provider about birth control (contraception).  Osteoporosis and menopause  Osteoporosis is a disease in which the bones lose minerals and strength with aging. This can result in serious bone fractures. Your risk for osteoporosis can be identified using a bone density scan.  If you are 65 years of age or older, or if you are at risk for osteoporosis and fractures, ask your health care provider if you should be screened.  Ask your health care provider whether you should take a calcium or vitamin D supplement to lower your risk for osteoporosis.  Menopause may have certain physical symptoms and risks.  Hormone replacement therapy may reduce some of these symptoms and risks.  Talk to your health care provider about whether hormone replacement therapy is right for you.  Follow these instructions at home:  Schedule regular health, dental, and eye exams.  Stay current with your immunizations.  Do not use any tobacco products including cigarettes, chewing tobacco, or electronic cigarettes.  If you are pregnant, do not drink alcohol.  If you are breastfeeding, limit how much and how often you drink alcohol.  Limit alcohol intake to no more than 1 drink per day for nonpregnant women. One drink equals 12 ounces of beer, 5 ounces of wine, or 1½ ounces of hard liquor.  Do not use street drugs.  Do not share needles.  Ask your health care provider for help if you need support or information about quitting drugs.  Tell your health care provider if you often feel depressed.  Tell your health care provider if you have ever been abused or do  not feel safe at home.  This information is not intended to replace advice given to you by your health care provider. Make sure you discuss any questions you have with your health care provider.  Document Released: 07/02/2012 Document Revised: 05/25/2017 Document Reviewed: 09/20/2016  [employer] Patient Education © 2018 [employer]

## 2025-02-11 NOTE — PROGRESS NOTES
New Patient Office Visit      Date: 2025  Patient Name: Vee Garvin  : 1980   MRN: [id number]     Chief Complaint   Patient presents with    Follow-up     Review CT scan       History of Present Illness: Vee Garvin is a 45 y.o. female who is here today for a get acquainted visit to establish care with a new provider and to complete annual wellness exam.  *** Patient denies any new or recent illness or injury. Denies falls, unexplained weight change, fevers, chills, or other constitutional symptoms. I have reviewed *** medical history with her  and updated the computerized patient record. Baseline screening tests were discussed today and she agrees to discuss health maintenance and goals in future appointments.      Subjective     Past Medical History:   Diagnosis Date    Abnormal electrocardiogram (ECG) (EKG)     Anemia     Arthritis     Cat scratch fever     GERD (gastroesophageal reflux disease)     History of blood transfusion     Hypertension     Kidney stones     Low vitamin D level     Scurvy        Past Surgical History:   Procedure Laterality Date    KNEE SURGERY N/A 10/2023    Surgery on Meniscus by Dr. Miller    VAGINAL DELIVERY      x6 (Kids)       Family History   Problem Relation Age of Onset    Asthma Mother     Hypertension Mother     Atrial fibrillation Mother     Fibroids Mother     Arthritis Mother     Hearing loss Mother     Heart disease Mother     Hyperlipidemia Mother     Diabetes Father     Hypertension Father     Neuropathy Father     High cholesterol Father     Heart disease Father     Hyperlipidemia Father     Kidney disease Father     Alcohol abuse Daughter     Anxiety disorder Daughter     Depression Daughter     Drug abuse Daughter     Heart disease Daughter         Bacteria on her heart from her drug abuse    Liver disease Daughter         She has acute HepC from her drug use    Mental illness Daughter     Miscarriages / Stillbirths Daughter     Asthma  "Son     Hypertension Maternal Aunt     Heart disease Maternal Aunt     Hypertension Maternal Uncle     Heart disease Maternal Uncle     Hypertension Paternal Aunt     Heart disease Paternal Aunt     Hypertension Paternal Uncle     Heart disease Paternal Uncle     Hypertension Maternal Grandmother     Heart disease Maternal Grandmother     Hypertension Maternal Grandfather     Heart disease Maternal Grandfather     Hypertension Paternal Grandmother     Heart disease Paternal Grandmother     Hypertension Paternal Grandfather     Heart disease Paternal Grandfather        Social History     Socioeconomic History    Marital status:    Tobacco Use    Smoking status: Former     Current packs/day: 0.00     Average packs/day: 0.3 packs/day for 1.4 years (0.4 ttl pk-yrs)     Types: Cigarettes     Start date:      Quit date: 2000     Years since quittin.7     Passive exposure: Never    Smokeless tobacco: Never    Tobacco comments:     I barely smoked a pack in a year. I only smoked occasionally     Patient claims quit smoking in .   Vaping Use    Vaping status: Never Used   Substance and Sexual Activity    Alcohol use: Never    Drug use: Never    Sexual activity: Yes     Partners: Male     Comment: I only have sex with my          Current Outpatient Medications   Medication Instructions    hydrOXYzine pamoate (VISTARIL) 25 mg, 3 Times Daily PRN    multivitamin with minerals (WOMENS MULTI VITAMIN & MINERAL PO) 1 tablet, Daily    omeprazole (PRILOSEC) 20 mg, Oral, Daily    ondansetron ODT (ZOFRAN-ODT) 4 mg, Every 6 Hours          Allergies   Allergen Reactions    Demerol [Meperidine] Swelling    Tramadol Swelling    Citrus Rash           Objective     Vitals:    25 1322   BP: Comment: unable to perform telehealth   Weight: 112 kg (248 lb)   Height: 162.6 cm (64\")   PainSc:   8   PainLoc: Hip          Physical Exam    PHQ-2 Depression Screening  Little interest or pleasure in doing things?   "   Feeling down, depressed, or hopeless?     PHQ-2 Total Score          Assessment / Plan      Assessment & Plan      Patient Education:   Reviewed medications, potential side effects and signs and symptoms to report.   Discussed risk vs benefits of treatment plan with patient including medications, labs, and imaging.    Patient education materials attached to AVS and reviewed with patient during office visit today.   Addressed questions and concerns during visit. Patient verbalized understanding and agrees with tx plan.   Instructed to call the office with any questions and report to ER with any life-threatening symptoms.    No follow-ups on file.    [facility] On license of [facility]  [address] [facility] [id number] [phone]    NOTE TO PATIENT:   The 21st Century Cures Act makes medical notes like these available to patients in the interest of transparency. However, be advised this is a medical document. It is intended as peer to peer communication. It is written in medical language and may contain abbreviations or verbiage that are unfamiliar. It may appear blunt or direct. Medical documents are intended to carry relevant information, facts as evident, and the clinical opinion of the practitioner.     EMR Dragon/Transcription disclaimer:   Much of this encounter note is an electronic transcription of spoken language to printed text. Electronic transcription of spoken language may permit erroneous, or at times, nonsensical words or phrases to be inadvertently transcribed. Although I have reviewed the note for such errors, some may still exist.

## 2025-02-11 NOTE — ASSESSMENT & PLAN NOTE
Patient scheduled follow-up telehealth to discuss recent results of CT scan.  Follow-up CT was also negative for bleed or any abnormality  Symptoms of headache, photophobia, phonophobia, and dizziness are slowly resolving  Patient is continuing with brain rest recommendations including limiting screen time.  She has no additional concerns at this time and feels like she is recovering well  Acknowledges she has routine wellness visit with Dr. Ibarra in May and will follow-up earlier than that if she has additional questions or concerns.

## 2025-05-22 ENCOUNTER — OFFICE VISIT (OUTPATIENT)
Dept: FAMILY MEDICINE CLINIC | Facility: CLINIC | Age: 45
End: 2025-05-22
Payer: COMMERCIAL

## 2025-05-22 VITALS
SYSTOLIC BLOOD PRESSURE: 128 MMHG | DIASTOLIC BLOOD PRESSURE: 80 MMHG | OXYGEN SATURATION: 96 % | HEART RATE: 60 BPM | RESPIRATION RATE: 12 BRPM | WEIGHT: 249 LBS | BODY MASS INDEX: 42.51 KG/M2 | HEIGHT: 64 IN

## 2025-05-22 DIAGNOSIS — G47.30 SLEEP APNEA, UNSPECIFIED TYPE: ICD-10-CM

## 2025-05-22 DIAGNOSIS — R53.83 FATIGUE, UNSPECIFIED TYPE: ICD-10-CM

## 2025-05-22 DIAGNOSIS — E55.9 VITAMIN D DEFICIENCY: ICD-10-CM

## 2025-05-22 DIAGNOSIS — S73.191D TEAR OF RIGHT ACETABULAR LABRUM, SUBSEQUENT ENCOUNTER: ICD-10-CM

## 2025-05-22 DIAGNOSIS — S06.9XAD MILD TRAUMATIC BRAIN INJURY, WITH UNKNOWN LOSS OF CONSCIOUSNESS STATUS, SUBSEQUENT ENCOUNTER: ICD-10-CM

## 2025-05-22 DIAGNOSIS — Z79.899 HIGH RISK MEDICATION USE: ICD-10-CM

## 2025-05-22 DIAGNOSIS — M25.551 PAIN OF RIGHT HIP: ICD-10-CM

## 2025-05-22 DIAGNOSIS — E87.6 HYPOKALEMIA: Primary | ICD-10-CM

## 2025-05-22 DIAGNOSIS — I10 PRIMARY HYPERTENSION: ICD-10-CM

## 2025-05-22 DIAGNOSIS — Z12.11 SCREENING FOR COLON CANCER: ICD-10-CM

## 2025-05-22 NOTE — PROGRESS NOTES
Follow Up Office Visit      Patient Name: Vee Garvin  : 1980   MRN: [id number]     Chief Complaint:    Chief Complaint   Patient presents with   • Hypertension     3 MO FOLLOW UP       History of Present Illness: Vee Garvin is a 45 y.o. female who is here today to   follow-up on her chronic medical problems and needs blood work today.    History of Present Illness  The patient is a 45-year-old female who presents today for a checkup.    She has been experiencing severe pain in her right hip, knee, and ankle.  She had an MRI performed and is under the care of Dr. Miller orthopedics.  A few months ago, she was informed that her labrum was significantly deteriorated. She underwent physical therapy, which provided temporary relief, but the pain has since returned. She continues to perform the exercises at home. She also reports swelling in her legs, which was attributed to arthritis by her doctor. However, the use of compression stockings during physical therapy has significantly reduced the swelling.    She has not had any recent blood work, with the last one conducted approximately 3 to 4 months ago. She has been adhering to a fasting regimen in anticipation of potential fasting blood work. She has not been taking vitamin D supplements since her supply was exhausted. She has not taken hydroxyzine or Zofran. She has not taken multivitamins for a while. She occasionally takes omeprazole for stomach acid but has not needed it recently. She is not on any blood pressure medications.  As she has been avoiding salt since she lost weight does not require blood pressure medicines at this point.        reView of systems:   She reports no chest pain, palpitations, cough, shortness of breath, fever, rashes, itching, or abdominal issues.    She has been making efforts to lose weight and maintain a healthy diet, particularly by reducing her salt intake. She has observed that excessive salt consumption  triggers heart palpitations, which subside when she reduces her salt intake. Despite her efforts, she feels her weight remains stagnant in the 240s range.    She has been experiencing fatigue and excessive sleepiness for the past few weeks, despite previously feeling energetic. She discussed this with her therapist yesterday, who recommended scheduling an appointment with her primary care physician. She occasionally snores and has never undergone a sleep study. She does not believe she stops breathing during sleep but sometimes feels as though she is not breathing when she falls asleep in the car, leading to panic.    She had a telehealth visit in 02/2025 after a fall where she tried to remove ice from her roof. The CT scan was negative for any bleeds or abnormalities. She had a massive bruise on her knee from the fall, which is still slightly visible and tender to touch.  No residual headaches or vision changes or mental status problems      Subjective      Review of Systems:   Review of Systems    Past Medical History:   Past Medical History:   Diagnosis Date   • Abnormal electrocardiogram (ECG) (EKG)    • Anemia    • Anxiety 2001    It comes and goes since 2001   • Arthritis    • Cat scratch fever    • GERD (gastroesophageal reflux disease)    • Headache 2025    Headaches come and go since being hot in the head   • History of blood transfusion    • Hypertension    • Kidney stones    • Low vitamin D level    • Obesity 2021   • Scoliosis 2023    Mild curvarture of the spine   • Scurvy        Past Surgical History:   Past Surgical History:   Procedure Laterality Date   • KNEE SURGERY N/A 10/2023    Surgery on Meniscus by Dr. Miller   • VAGINAL DELIVERY      x6 (Kids)       Family History:   Family History   Problem Relation Age of Onset   • Asthma Mother    • Hypertension Mother    • Atrial fibrillation Mother    • Fibroids Mother    • Arthritis Mother    • Hearing loss Mother    • Heart disease Mother    •  Hyperlipidemia Mother    • Anemia Mother         Was diagnosed as a child , but does not have currently   • Heart attack Mother         Mild   • Diabetes Father    • Hypertension Father    • Neuropathy Father    • High cholesterol Father    • Heart disease Father    • Hyperlipidemia Father    • Kidney disease Father    • Heart attack Father         He had 1 minor heart attack and later on he had a major one which he  of soon after   • Alcohol abuse Daughter    • Anxiety disorder Daughter    • Depression Daughter    • Drug abuse Daughter    • Heart disease Daughter         Bacteria on her heart from her drug abuse   • Liver disease Daughter         She has acute HepC from her drug use   • Mental illness Daughter    • Miscarriages / Stillbirths Daughter    • Asthma Son    • Hypertension Maternal Aunt    • Heart disease Maternal Aunt    • Hypertension Maternal Uncle    • Heart disease Maternal Uncle    • Hypertension Paternal Aunt    • Heart disease Paternal Aunt    • Hypertension Paternal Uncle    • Heart disease Paternal Uncle    • Hypertension Maternal Grandmother    • Heart disease Maternal Grandmother    • Hypertension Maternal Grandfather    • Heart disease Maternal Grandfather    • Hypertension Paternal Grandmother    • Heart disease Paternal Grandmother    • Hypertension Paternal Grandfather    • Heart disease Paternal Grandfather        Social History:   Social History     Socioeconomic History   • Marital status:    Tobacco Use   • Smoking status: Former     Current packs/day: 0.00     Average packs/day: 0.3 packs/day for 1.4 years (0.4 ttl pk-yrs)     Types: Cigarettes     Start date:      Quit date: 2000     Years since quittin.9     Passive exposure: Never   • Smokeless tobacco: Never   • Tobacco comments:     I barely smoked a pack in a year. I only smoked occasionally   Vaping Use   • Vaping status: Never Used   Substance and Sexual Activity   • Alcohol use: Never   • Drug use:  "Never   • Sexual activity: Yes     Partners: Male     Comment: I only have sex with my        Medications:     Current Outpatient Medications:   •  hydrOXYzine pamoate (VISTARIL) 25 MG capsule, Take 1 capsule by mouth 3 (Three) Times a Day As Needed for Itching., Disp: , Rfl:   •  multivitamin with minerals (WOMENS MULTI VITAMIN & MINERAL PO), Take 1 tablet by mouth Daily., Disp: , Rfl:   •  omeprazole (priLOSEC) 20 MG capsule, Take 1 capsule by mouth Daily., Disp: 30 capsule, Rfl: 1    Allergies:   Allergies   Allergen Reactions   • Demerol [Meperidine] Swelling   • Tramadol Swelling   • Citrus Rash       Objective     Physical Exam:  Vital Signs:   Vitals:    05/22/25 0932   BP: 128/80   Pulse: 60   Resp: 12   SpO2: 96%   Weight: 113 kg (249 lb)   Height: 162.6 cm (64\")   PainSc: 8    PainLoc: Hip     Facility age limit for growth %ariel is 20 years.  Body mass index is 42.74 kg/m².     Physical Exam  Vitals and nursing note reviewed.   Constitutional:       Appearance: Normal appearance.   HENT:      Head: Normocephalic and atraumatic.      Nose: Nose normal.   Cardiovascular:      Rate and Rhythm: Normal rate and regular rhythm.   Pulmonary:      Effort: Pulmonary effort is normal.      Breath sounds: Normal breath sounds.   Abdominal:      Palpations: Abdomen is soft.      Tenderness: There is no abdominal tenderness.   Musculoskeletal:         General: Normal range of motion.      Cervical back: Normal range of motion and neck supple.      Right lower leg: No edema.      Left lower leg: No edema.   Skin:     General: Skin is warm and dry.   Neurological:      General: No focal deficit present.      Mental Status: She is alert.   Psychiatric:         Mood and Affect: Mood normal.         Behavior: Behavior normal.         Procedures    PHQ-9 Total Score:      Assessment / Plan      Assessment/Plan:   Diagnoses and all orders for this visit:    1. Hypokalemia (Primary)  -     Comprehensive Metabolic Panel; " Future  -     Magnesium; Future  -     Magnesium  -     Comprehensive Metabolic Panel    2. Sleep apnea, unspecified type  -     Ambulatory Referral to ENT (Otolaryngology)    3. Fatigue, unspecified type  -     Vitamin B12; Future  -     Ambulatory Referral to ENT (Otolaryngology)  -     Vitamin B12    4. High risk medication use  -     Comprehensive Metabolic Panel; Future  -     CBC Auto Differential; Future  -     Magnesium; Future  -     Magnesium  -     CBC Auto Differential  -     Comprehensive Metabolic Panel    5. Mild traumatic brain injury, with unknown loss of consciousness status, subsequent encounter    6. Primary hypertension    7. Vitamin D deficiency  -     Vitamin D,25-Hydroxy; Future  -     Vitamin D,25-Hydroxy    8. Screening for colon cancer  -     Ambulatory Referral to Gastroenterology    9. Pain of right hip    10. Tear of right acetabular labrum, subsequent encounter         Assessment & Plan  1. Health maintenance.  Her potassium levels were notably low in October 2024, and her vitamin D levels were also slightly below the normal range. Her B12 levels were within the normal range as of June 2024.  A chemistry profile, including liver and kidney function tests, electrolyte panel, magnesium level, and CBC will be ordered. Additionally, her B12 and vitamin D levels will be assessed.  A referral for a colonoscopy has been initiated with Dr. Jolie Peng.  She has been advised to maintain a balanced diet, rich in vegetables and lean meats, while monitoring her carbohydrate intake. Regular physical activity, such as walking, biking, or swimming for 30 minutes five days a week, has been recommended.    2. Right hip pain.  She reports persistent right hip pain despite ongoing physical therapy. The pain initially improved but has recently worsened.  Physical therapy was initially beneficial but the pain has returned.  A thorough review of her symptoms and history was conducted.  Continued home physical  therapy is recommended.  Follow-up with orthopedics Dr. Miller as directed    3. Fatigue.  She reports significant fatigue and excessive sleepiness over the past few weeks.  Thyroid function and B12 levels were previously normal.  A sleep study will be arranged with Dr. Meza to evaluate for potential sleep apnea.  Discussion included the potential benefits of a CPAP machine if sleep apnea is diagnosed.    4. Weight management.  She reports difficulty losing weight despite efforts to follow dietary recommendations. Her weight fluctuates between 235 and 247 pounds.  Efforts to reduce salt intake have helped manage heart palpitations.  A review of her dietary habits and physical activity was conducted.  Continue follow-ups with weight loss providers  Continued adherence to dietary recommendations and regular physical activity is advised.    Follow-up  The patient will follow up in 6 months for a routine physical examination.  Encouraged good diet and exercise as directed     Follow-up on blood work within 1 week    Follow Up:   Return in about 6 months (around 11/22/2025) for Labs prior next visit, Annual physical.        Patient or patient representative verbalized consent for the use of Ambient Listening during the visit with  Patrick Ibarra MD for chart documentation. 5/22/2025  10:30 EDT    Patrick Ibarra MD  [facility]   Portions of note created with Dragon voice recognition technology

## 2025-05-23 LAB
25(OH)D3+25(OH)D2 SERPL-MCNC: 19.4 NG/ML (ref 30–100)
ALBUMIN SERPL-MCNC: 4.4 G/DL (ref 3.9–4.9)
ALP SERPL-CCNC: 59 IU/L (ref 44–121)
ALT SERPL-CCNC: 18 IU/L (ref 0–32)
AST SERPL-CCNC: 22 IU/L (ref 0–40)
BASOPHILS # BLD AUTO: 0 X10E3/UL (ref 0–0.2)
BASOPHILS NFR BLD AUTO: 1 %
BILIRUB SERPL-MCNC: 0.9 MG/DL (ref 0–1.2)
BUN SERPL-MCNC: 16 MG/DL (ref 6–24)
BUN/CREAT SERPL: 20 (ref 9–23)
CALCIUM SERPL-MCNC: 9.7 MG/DL (ref 8.7–10.2)
CHLORIDE SERPL-SCNC: 101 MMOL/L (ref 96–106)
CO2 SERPL-SCNC: 21 MMOL/L (ref 20–29)
CREAT SERPL-MCNC: 0.81 MG/DL (ref 0.57–1)
EGFRCR SERPLBLD CKD-EPI 2021: 91 ML/MIN/1.73
EOSINOPHIL # BLD AUTO: 0.4 X10E3/UL (ref 0–0.4)
EOSINOPHIL NFR BLD AUTO: 5 %
ERYTHROCYTE [DISTWIDTH] IN BLOOD BY AUTOMATED COUNT: 13 % (ref 11.7–15.4)
GLOBULIN SER CALC-MCNC: 3.1 G/DL (ref 1.5–4.5)
GLUCOSE SERPL-MCNC: 80 MG/DL (ref 70–99)
HCT VFR BLD AUTO: 44.5 % (ref 34–46.6)
HGB BLD-MCNC: 14.6 G/DL (ref 11.1–15.9)
IMM GRANULOCYTES # BLD AUTO: 0 X10E3/UL (ref 0–0.1)
IMM GRANULOCYTES NFR BLD AUTO: 0 %
LYMPHOCYTES # BLD AUTO: 1.9 X10E3/UL (ref 0.7–3.1)
LYMPHOCYTES NFR BLD AUTO: 23 %
MAGNESIUM SERPL-MCNC: 1.9 MG/DL (ref 1.6–2.3)
MCH RBC QN AUTO: 31.3 PG (ref 26.6–33)
MCHC RBC AUTO-ENTMCNC: 32.8 G/DL (ref 31.5–35.7)
MCV RBC AUTO: 96 FL (ref 79–97)
MONOCYTES # BLD AUTO: 0.5 X10E3/UL (ref 0.1–0.9)
MONOCYTES NFR BLD AUTO: 6 %
NEUTROPHILS # BLD AUTO: 5.3 X10E3/UL (ref 1.4–7)
NEUTROPHILS NFR BLD AUTO: 65 %
PLATELET # BLD AUTO: 286 X10E3/UL (ref 150–450)
POTASSIUM SERPL-SCNC: 4.4 MMOL/L (ref 3.5–5.2)
PROT SERPL-MCNC: 7.5 G/DL (ref 6–8.5)
RBC # BLD AUTO: 4.66 X10E6/UL (ref 3.77–5.28)
SODIUM SERPL-SCNC: 138 MMOL/L (ref 134–144)
VIT B12 SERPL-MCNC: 443 PG/ML (ref 232–1245)
WBC # BLD AUTO: 8.2 X10E3/UL (ref 3.4–10.8)

## 2025-05-28 ENCOUNTER — TELEPHONE (OUTPATIENT)
Dept: FAMILY MEDICINE CLINIC | Facility: CLINIC | Age: 45
End: 2025-05-28

## 2025-05-28 DIAGNOSIS — E55.9 VITAMIN D DEFICIENCY: Primary | ICD-10-CM

## 2025-05-28 NOTE — TELEPHONE ENCOUNTER
"  Caller: Vee Garvin \"JAME\"    Relationship: Self    Best call back number: [phone]     Requested Prescriptions:   VITAMIN D3 5000 UNITS       Pharmacy where request should be sent: [id number] - [place], [place] - [address]  AT [facility] [address] - [phone]  - [phone] FX     Last office visit with prescribing clinician: 5/22/2025   Last telemedicine visit with prescribing clinician: 2/11/2025   Next office visit with prescribing clinician: 11/25/2025     Additional details provided by patient: DR GARDNER SAID SHE NEEDS THIS AND SHE WANTS HIM TO CALL IT IN    Does the patient have less than a 3 day supply:  [x] Yes  [] No    Would you like a call back once the refill request has been completed: [] Yes [x] No    If the office needs to give you a call back, can they leave a voicemail: [] Yes [x] No    Ophelia Alvarado   05/28/25 15:01 EDT         "

## 2025-08-20 ENCOUNTER — RESULTS FOLLOW-UP (OUTPATIENT)
Dept: EMERGENCY DEPT | Facility: HOSPITAL | Age: 45
End: 2025-08-20
Payer: COMMERCIAL

## 2025-08-20 ENCOUNTER — APPOINTMENT (OUTPATIENT)
Dept: CT IMAGING | Facility: HOSPITAL | Age: 45
End: 2025-08-20
Payer: COMMERCIAL

## 2025-08-20 ENCOUNTER — HOSPITAL ENCOUNTER (EMERGENCY)
Facility: HOSPITAL | Age: 45
Discharge: HOME | End: 2025-08-20
Attending: STUDENT IN AN ORGANIZED HEALTH CARE EDUCATION/TRAINING PROGRAM | Admitting: STUDENT IN AN ORGANIZED HEALTH CARE EDUCATION/TRAINING PROGRAM
Payer: COMMERCIAL